# Patient Record
Sex: FEMALE | Race: WHITE | NOT HISPANIC OR LATINO | Employment: FULL TIME | URBAN - METROPOLITAN AREA
[De-identification: names, ages, dates, MRNs, and addresses within clinical notes are randomized per-mention and may not be internally consistent; named-entity substitution may affect disease eponyms.]

---

## 2023-02-24 PROBLEM — K62.5 RECTAL BLEEDING: Status: ACTIVE | Noted: 2023-02-24

## 2023-02-24 PROBLEM — E78.5 LIPIDEMIA: Status: ACTIVE | Noted: 2023-02-24

## 2023-02-24 PROBLEM — G47.33 OBSTRUCTIVE SLEEP APNEA: Status: ACTIVE | Noted: 2023-02-24

## 2023-02-24 PROBLEM — F32.A DEPRESSION: Status: ACTIVE | Noted: 2023-02-24

## 2023-02-24 PROBLEM — N92.0 HEAVY MENSTRUAL BLEEDING: Status: ACTIVE | Noted: 2023-02-24

## 2023-02-24 PROBLEM — I49.1 PAC (PREMATURE ATRIAL CONTRACTION): Status: ACTIVE | Noted: 2023-02-24

## 2023-02-24 PROBLEM — R53.83 FATIGUE: Status: ACTIVE | Noted: 2023-02-24

## 2023-02-24 PROBLEM — H52.13 MYOPIA OF BOTH EYES: Status: ACTIVE | Noted: 2023-02-24

## 2023-02-24 PROBLEM — K21.9 GERD (GASTROESOPHAGEAL REFLUX DISEASE): Status: ACTIVE | Noted: 2023-02-24

## 2023-02-24 PROBLEM — F41.8 DEPRESSION WITH ANXIETY: Status: ACTIVE | Noted: 2023-02-24

## 2023-02-24 PROBLEM — H52.7 REFRACTION ERROR: Status: ACTIVE | Noted: 2023-02-24

## 2023-02-24 PROBLEM — I10 BENIGN ESSENTIAL HYPERTENSION: Status: ACTIVE | Noted: 2023-02-24

## 2023-02-24 PROBLEM — R73.03 PREDIABETES: Status: ACTIVE | Noted: 2023-02-24

## 2023-02-24 PROBLEM — H04.123 DRY EYES, BILATERAL: Status: ACTIVE | Noted: 2023-02-24

## 2023-02-24 PROBLEM — R07.89 ATYPICAL CHEST PAIN: Status: ACTIVE | Noted: 2023-02-24

## 2023-02-24 PROBLEM — E55.9 VITAMIN D DEFICIENCY: Status: ACTIVE | Noted: 2023-02-24

## 2023-02-24 PROBLEM — H01.003 BLEPHARITIS OF RIGHT EYE: Status: ACTIVE | Noted: 2023-02-24

## 2023-02-24 PROBLEM — R94.31 ABNORMAL EKG: Status: ACTIVE | Noted: 2023-02-24

## 2023-02-24 PROBLEM — H01.006 BLEPHARITIS OF LEFT EYE: Status: ACTIVE | Noted: 2023-02-24

## 2023-02-24 PROBLEM — E11.65 UNCONTROLLED TYPE 2 DIABETES MELLITUS WITH HYPERGLYCEMIA (MULTI): Status: ACTIVE | Noted: 2023-02-24

## 2023-02-24 PROBLEM — E78.2 HYPERLIPEMIA, MIXED: Status: ACTIVE | Noted: 2023-02-24

## 2023-02-24 PROBLEM — R00.2 PALPITATIONS: Status: ACTIVE | Noted: 2023-02-24

## 2023-02-24 PROBLEM — M54.6 ACUTE BILATERAL THORACIC BACK PAIN: Status: ACTIVE | Noted: 2023-02-24

## 2023-02-24 PROBLEM — N93.9 ABNORMAL UTERINE BLEEDING: Status: ACTIVE | Noted: 2023-02-24

## 2023-02-24 PROBLEM — J45.909 ASTHMA (HHS-HCC): Status: ACTIVE | Noted: 2023-02-24

## 2023-02-24 RX ORDER — ALBUTEROL SULFATE 90 UG/1
1 AEROSOL, METERED RESPIRATORY (INHALATION) EVERY 4 HOURS PRN
COMMUNITY
Start: 2015-09-22

## 2023-02-24 RX ORDER — BLOOD SUGAR DIAGNOSTIC
1 STRIP MISCELLANEOUS 2 TIMES DAILY
COMMUNITY
Start: 2022-09-08

## 2023-02-24 RX ORDER — TRAZODONE HYDROCHLORIDE 100 MG/1
100 TABLET ORAL NIGHTLY
COMMUNITY
Start: 2022-08-03

## 2023-02-24 RX ORDER — LOSARTAN POTASSIUM 100 MG/1
100 TABLET ORAL DAILY
COMMUNITY
Start: 2021-11-04 | End: 2024-02-12 | Stop reason: SDUPTHER

## 2023-02-24 RX ORDER — NORETHINDRONE 5 MG/1
5 TABLET ORAL DAILY
COMMUNITY
Start: 2021-12-28

## 2023-02-24 RX ORDER — BUPROPION HYDROCHLORIDE 150 MG/1
150 TABLET ORAL
COMMUNITY
Start: 2022-08-03 | End: 2024-02-12 | Stop reason: WASHOUT

## 2023-02-24 RX ORDER — AMLODIPINE BESYLATE 10 MG/1
10 TABLET ORAL DAILY
COMMUNITY
Start: 2019-03-11 | End: 2024-02-07

## 2023-02-24 RX ORDER — VILAZODONE HYDROCHLORIDE 40 MG/1
40 TABLET ORAL DAILY
COMMUNITY
Start: 2013-12-26

## 2023-02-24 RX ORDER — METFORMIN HYDROCHLORIDE 500 MG/1
500 TABLET ORAL
COMMUNITY
Start: 2022-09-08 | End: 2023-04-28 | Stop reason: SDUPTHER

## 2023-04-26 PROBLEM — E11.9 DIABETES MELLITUS TYPE 2 WITHOUT RETINOPATHY (MULTI): Status: ACTIVE | Noted: 2023-04-26

## 2023-04-26 RX ORDER — QUETIAPINE 150 MG/1
150 TABLET, FILM COATED, EXTENDED RELEASE ORAL NIGHTLY
COMMUNITY
Start: 2023-04-02

## 2023-04-26 RX ORDER — ZOLPIDEM TARTRATE 5 MG/1
TABLET ORAL
COMMUNITY
Start: 2022-06-13

## 2023-04-26 RX ORDER — OMEPRAZOLE 40 MG/1
1 CAPSULE, DELAYED RELEASE ORAL DAILY
COMMUNITY
Start: 2022-12-27 | End: 2024-03-26

## 2023-04-26 RX ORDER — CLONAZEPAM 0.5 MG/1
1 TABLET ORAL NIGHTLY PRN
COMMUNITY
Start: 2023-03-15

## 2023-04-26 RX ORDER — LANCETS 30 GAUGE
EACH MISCELLANEOUS
COMMUNITY
Start: 2022-10-04

## 2023-04-26 RX ORDER — DULAGLUTIDE 0.75 MG/.5ML
INJECTION, SOLUTION SUBCUTANEOUS
COMMUNITY
Start: 2022-12-27 | End: 2023-04-28 | Stop reason: ALTCHOICE

## 2023-04-26 RX ORDER — ATORVASTATIN CALCIUM 20 MG/1
TABLET, FILM COATED ORAL
COMMUNITY
Start: 2023-01-03 | End: 2023-06-29

## 2023-04-28 ENCOUNTER — OFFICE VISIT (OUTPATIENT)
Dept: PRIMARY CARE | Facility: CLINIC | Age: 48
End: 2023-04-28
Payer: COMMERCIAL

## 2023-04-28 ENCOUNTER — LAB (OUTPATIENT)
Dept: LAB | Facility: LAB | Age: 48
End: 2023-04-28
Payer: COMMERCIAL

## 2023-04-28 VITALS
BODY MASS INDEX: 33.83 KG/M2 | HEART RATE: 89 BPM | DIASTOLIC BLOOD PRESSURE: 79 MMHG | SYSTOLIC BLOOD PRESSURE: 120 MMHG | WEIGHT: 191 LBS

## 2023-04-28 DIAGNOSIS — E78.5 HYPERLIPIDEMIA, UNSPECIFIED HYPERLIPIDEMIA TYPE: ICD-10-CM

## 2023-04-28 DIAGNOSIS — E11.9 DIABETES MELLITUS TYPE 2 WITHOUT RETINOPATHY (MULTI): ICD-10-CM

## 2023-04-28 DIAGNOSIS — I10 BENIGN ESSENTIAL HYPERTENSION: Primary | ICD-10-CM

## 2023-04-28 DIAGNOSIS — E66.9 CLASS 1 OBESITY WITHOUT SERIOUS COMORBIDITY WITH BODY MASS INDEX (BMI) OF 33.0 TO 33.9 IN ADULT, UNSPECIFIED OBESITY TYPE: ICD-10-CM

## 2023-04-28 DIAGNOSIS — K21.9 GASTROESOPHAGEAL REFLUX DISEASE WITHOUT ESOPHAGITIS: ICD-10-CM

## 2023-04-28 DIAGNOSIS — F41.8 DEPRESSION WITH ANXIETY: ICD-10-CM

## 2023-04-28 PROBLEM — F33.1 MODERATE EPISODE OF RECURRENT MAJOR DEPRESSIVE DISORDER (MULTI): Chronic | Status: ACTIVE | Noted: 2022-04-28

## 2023-04-28 PROBLEM — E78.2 MIXED HYPERLIPIDEMIA: Status: ACTIVE | Noted: 2022-01-17

## 2023-04-28 PROBLEM — F32.0 MAJOR DEPRESSIVE DISORDER, SINGLE EPISODE, MILD (CMS-HCC): Chronic | Status: ACTIVE | Noted: 2022-06-14

## 2023-04-28 LAB
ALANINE AMINOTRANSFERASE (SGPT) (U/L) IN SER/PLAS: 30 U/L (ref 7–45)
ALBUMIN (G/DL) IN SER/PLAS: 4.6 G/DL (ref 3.4–5)
ALKALINE PHOSPHATASE (U/L) IN SER/PLAS: 39 U/L (ref 33–110)
ANION GAP IN SER/PLAS: 15 MMOL/L (ref 10–20)
ASPARTATE AMINOTRANSFERASE (SGOT) (U/L) IN SER/PLAS: 20 U/L (ref 9–39)
BILIRUBIN TOTAL (MG/DL) IN SER/PLAS: 0.5 MG/DL (ref 0–1.2)
CALCIUM (MG/DL) IN SER/PLAS: 9.3 MG/DL (ref 8.6–10.6)
CARBON DIOXIDE, TOTAL (MMOL/L) IN SER/PLAS: 23 MMOL/L (ref 21–32)
CHLORIDE (MMOL/L) IN SER/PLAS: 103 MMOL/L (ref 98–107)
CREATININE (MG/DL) IN SER/PLAS: 0.71 MG/DL (ref 0.5–1.05)
GFR FEMALE: >90 ML/MIN/1.73M2
GLUCOSE (MG/DL) IN SER/PLAS: 247 MG/DL (ref 74–99)
POC HEMOGLOBIN A1C: 8.6 % (ref 4.2–6.5)
POTASSIUM (MMOL/L) IN SER/PLAS: 4.6 MMOL/L (ref 3.5–5.3)
PROTEIN TOTAL: 7.3 G/DL (ref 6.4–8.2)
SODIUM (MMOL/L) IN SER/PLAS: 136 MMOL/L (ref 136–145)
UREA NITROGEN (MG/DL) IN SER/PLAS: 12 MG/DL (ref 6–23)

## 2023-04-28 PROCEDURE — 80053 COMPREHEN METABOLIC PANEL: CPT

## 2023-04-28 PROCEDURE — 36415 COLL VENOUS BLD VENIPUNCTURE: CPT

## 2023-04-28 PROCEDURE — 4010F ACE/ARB THERAPY RXD/TAKEN: CPT | Performed by: INTERNAL MEDICINE

## 2023-04-28 PROCEDURE — 3008F BODY MASS INDEX DOCD: CPT | Performed by: INTERNAL MEDICINE

## 2023-04-28 PROCEDURE — 3074F SYST BP LT 130 MM HG: CPT | Performed by: INTERNAL MEDICINE

## 2023-04-28 PROCEDURE — 99214 OFFICE O/P EST MOD 30 MIN: CPT | Performed by: INTERNAL MEDICINE

## 2023-04-28 PROCEDURE — 83036 HEMOGLOBIN GLYCOSYLATED A1C: CPT | Performed by: INTERNAL MEDICINE

## 2023-04-28 PROCEDURE — 4004F PT TOBACCO SCREEN RCVD TLK: CPT | Performed by: INTERNAL MEDICINE

## 2023-04-28 PROCEDURE — 3078F DIAST BP <80 MM HG: CPT | Performed by: INTERNAL MEDICINE

## 2023-04-28 RX ORDER — METFORMIN HYDROCHLORIDE 500 MG/1
500 TABLET ORAL
Qty: 180 TABLET | Refills: 1 | Status: SHIPPED | OUTPATIENT
Start: 2023-04-28 | End: 2023-12-26

## 2023-04-28 RX ORDER — DULAGLUTIDE 1.5 MG/.5ML
1.5 INJECTION, SOLUTION SUBCUTANEOUS
Qty: 4 EACH | Refills: 11 | Status: SHIPPED | OUTPATIENT
Start: 2023-04-28 | End: 2023-08-11 | Stop reason: SDUPTHER

## 2023-04-28 ASSESSMENT — ENCOUNTER SYMPTOMS
CONSTIPATION: 0
MYALGIAS: 0
FREQUENCY: 0
PALPITATIONS: 0
BLOOD IN STOOL: 0
ARTHRALGIAS: 0
HEADACHES: 0
CHILLS: 0
NERVOUS/ANXIOUS: 1
SLEEP DISTURBANCE: 0
NECK PAIN: 0
ABDOMINAL PAIN: 0
DIZZINESS: 0
FATIGUE: 0
DIFFICULTY URINATING: 0
SINUS PAIN: 0
SINUS PRESSURE: 0
BACK PAIN: 0
COUGH: 0
SHORTNESS OF BREATH: 0
DIARRHEA: 0
FEVER: 0

## 2023-04-28 NOTE — PATIENT INSTRUCTIONS
Hba1c 8.6  Increase the dose of Trulicity to 1.5 mg  Start metformin 500 mg twice a day with food  Start exercising for 30 to 45 minutes a day  Cut back and quit tobacco  Follow-up in 3 months

## 2023-04-28 NOTE — PROGRESS NOTES
Subjective   Patient ID: Linnea Markham is a 48 y.o. female.    Patient is seen today for follow-up on her diabetes.  Her medical history significant for hypertension, diabetes, anxiety, depression, current tobacco use.  Patient states that she has been extremely stressed out due to her work.  She has been using smokeless tobacco but is ready to quit.  She is currently taking Trulicity for diabetes.  She is not taking metformin.  Exercise-occasional         Review of Systems   Constitutional:  Negative for chills, fatigue and fever.   HENT:  Negative for congestion, postnasal drip, sinus pressure and sinus pain.    Respiratory:  Negative for cough and shortness of breath.    Cardiovascular:  Negative for chest pain, palpitations and leg swelling.   Gastrointestinal:  Negative for abdominal pain, blood in stool, constipation and diarrhea.   Endocrine: Negative for cold intolerance and heat intolerance.   Genitourinary:  Negative for difficulty urinating and frequency.   Musculoskeletal:  Negative for arthralgias, back pain, myalgias and neck pain.   Neurological:  Negative for dizziness and headaches.   Psychiatric/Behavioral:  Negative for sleep disturbance. The patient is nervous/anxious.        Objective   Physical Exam  Vitals reviewed.   Constitutional:       General: She is not in acute distress.     Appearance: Normal appearance.   HENT:      Head: Normocephalic and atraumatic.      Mouth/Throat:      Mouth: Mucous membranes are moist.   Eyes:      Extraocular Movements: Extraocular movements intact.      Conjunctiva/sclera: Conjunctivae normal.      Pupils: Pupils are equal, round, and reactive to light.   Cardiovascular:      Rate and Rhythm: Normal rate and regular rhythm.      Pulses: Normal pulses.   Pulmonary:      Effort: Pulmonary effort is normal. No respiratory distress.      Breath sounds: Normal breath sounds.   Abdominal:      General: Bowel sounds are normal. There is no distension.       Tenderness: There is no abdominal tenderness.   Musculoskeletal:         General: No swelling or tenderness. Normal range of motion.      Cervical back: Normal range of motion.   Skin:     General: Skin is warm.   Neurological:      General: No focal deficit present.      Mental Status: She is alert.      Coordination: Coordination normal.      Gait: Gait normal.   Psychiatric:         Mood and Affect: Mood normal.         Behavior: Behavior normal.         Assessment/Plan   Diagnoses and all orders for this visit:  Benign essential hypertension  Comments:  Well-controlled  Continue with amlodipine and losartan  Diabetes mellitus type 2 without retinopathy (CMS/[facility])  Comments:  RaO2c-3.6  Diabetes is worsened  Increase Trulicity to 1.5 mg daily  Restart metformin 500 twice daily  Orders:  -     POCT glycosylated hemoglobin (Hb A1C) manually resulted  -     dulaglutide (Trulicity) 1.5 mg/0.5 mL pen injector; Inject 1.5 mg under the skin 1 (one) time per week.  -     metFORMIN (Glucophage) 500 mg tablet; Take 1 tablet (500 mg) by mouth 2 times a day with meals.  -     Comprehensive Metabolic Panel; Future  Gastroesophageal reflux disease without esophagitis  Comments:  stable  Class 1 obesity without serious comorbidity with body mass index (BMI) of 33.0 to 33.9 in adult, unspecified obesity type  Depression with anxiety  Comments:  Worsening  Continue Wellbutrin  Patient to make appointment with psychiatry  Hyperlipidemia, unspecified hyperlipidemia type  Comments:  Continue with atorvastatin  Check CMP today  Follow in 3 months

## 2023-06-29 DIAGNOSIS — E78.2 MIXED HYPERLIPIDEMIA: ICD-10-CM

## 2023-06-29 RX ORDER — ATORVASTATIN CALCIUM 20 MG/1
TABLET, FILM COATED ORAL
Qty: 90 TABLET | Refills: 1 | Status: SHIPPED | OUTPATIENT
Start: 2023-06-29 | End: 2023-12-26

## 2023-08-03 ENCOUNTER — APPOINTMENT (OUTPATIENT)
Dept: PRIMARY CARE | Facility: CLINIC | Age: 48
End: 2023-08-03
Payer: COMMERCIAL

## 2023-08-11 DIAGNOSIS — E11.9 DIABETES MELLITUS TYPE 2 WITHOUT RETINOPATHY (MULTI): ICD-10-CM

## 2023-08-14 RX ORDER — DULAGLUTIDE 1.5 MG/.5ML
1.5 INJECTION, SOLUTION SUBCUTANEOUS
Qty: 4 EACH | Refills: 8 | Status: SHIPPED | OUTPATIENT
Start: 2023-08-14

## 2023-12-24 DIAGNOSIS — E78.2 MIXED HYPERLIPIDEMIA: ICD-10-CM

## 2023-12-26 DIAGNOSIS — E11.9 DIABETES MELLITUS TYPE 2 WITHOUT RETINOPATHY (MULTI): ICD-10-CM

## 2023-12-26 RX ORDER — METFORMIN HYDROCHLORIDE 500 MG/1
500 TABLET ORAL
Qty: 180 TABLET | Refills: 1 | Status: SHIPPED | OUTPATIENT
Start: 2023-12-26 | End: 2024-05-08

## 2023-12-26 RX ORDER — ATORVASTATIN CALCIUM 20 MG/1
TABLET, FILM COATED ORAL
Qty: 90 TABLET | Refills: 0 | Status: SHIPPED | OUTPATIENT
Start: 2023-12-26 | End: 2024-03-26

## 2024-02-07 DIAGNOSIS — I10 ESSENTIAL (PRIMARY) HYPERTENSION: ICD-10-CM

## 2024-02-07 RX ORDER — AMLODIPINE BESYLATE 10 MG/1
10 TABLET ORAL DAILY
Qty: 90 TABLET | Refills: 0 | Status: SHIPPED | OUTPATIENT
Start: 2024-02-07 | End: 2024-02-12 | Stop reason: SDUPTHER

## 2024-02-12 ENCOUNTER — OFFICE VISIT (OUTPATIENT)
Dept: PRIMARY CARE | Facility: CLINIC | Age: 49
End: 2024-02-12
Payer: COMMERCIAL

## 2024-02-12 VITALS
DIASTOLIC BLOOD PRESSURE: 93 MMHG | SYSTOLIC BLOOD PRESSURE: 145 MMHG | TEMPERATURE: 96 F | BODY MASS INDEX: 33.3 KG/M2 | WEIGHT: 188 LBS

## 2024-02-12 DIAGNOSIS — E11.9 DIABETES MELLITUS TYPE 2 WITHOUT RETINOPATHY (MULTI): Primary | ICD-10-CM

## 2024-02-12 DIAGNOSIS — F41.8 DEPRESSION WITH ANXIETY: ICD-10-CM

## 2024-02-12 DIAGNOSIS — I10 BENIGN ESSENTIAL HYPERTENSION: ICD-10-CM

## 2024-02-12 DIAGNOSIS — E78.5 HYPERLIPIDEMIA, UNSPECIFIED HYPERLIPIDEMIA TYPE: ICD-10-CM

## 2024-02-12 PROBLEM — N93.9 ABNORMAL UTERINE BLEEDING: Status: RESOLVED | Noted: 2023-02-24 | Resolved: 2024-02-12

## 2024-02-12 PROBLEM — H52.13 MYOPIA OF BOTH EYES: Status: RESOLVED | Noted: 2023-02-24 | Resolved: 2024-02-12

## 2024-02-12 PROBLEM — H01.003 BLEPHARITIS OF RIGHT EYE: Status: RESOLVED | Noted: 2023-02-24 | Resolved: 2024-02-12

## 2024-02-12 PROBLEM — R94.31 ABNORMAL EKG: Status: RESOLVED | Noted: 2023-02-24 | Resolved: 2024-02-12

## 2024-02-12 PROBLEM — R73.03 PREDIABETES: Status: RESOLVED | Noted: 2023-02-24 | Resolved: 2024-02-12

## 2024-02-12 PROBLEM — K62.5 RECTAL BLEEDING: Status: RESOLVED | Noted: 2023-02-24 | Resolved: 2024-02-12

## 2024-02-12 PROBLEM — R53.83 FATIGUE: Status: RESOLVED | Noted: 2023-02-24 | Resolved: 2024-02-12

## 2024-02-12 PROBLEM — R07.89 ATYPICAL CHEST PAIN: Status: RESOLVED | Noted: 2023-02-24 | Resolved: 2024-02-12

## 2024-02-12 PROBLEM — J45.909 ASTHMA (HHS-HCC): Status: RESOLVED | Noted: 2023-02-24 | Resolved: 2024-02-12

## 2024-02-12 PROBLEM — H52.7 REFRACTION ERROR: Status: RESOLVED | Noted: 2023-02-24 | Resolved: 2024-02-12

## 2024-02-12 PROBLEM — F32.0 MAJOR DEPRESSIVE DISORDER, SINGLE EPISODE, MILD (CMS-HCC): Chronic | Status: RESOLVED | Noted: 2022-06-14 | Resolved: 2024-02-12

## 2024-02-12 PROBLEM — E78.2 MIXED HYPERLIPIDEMIA: Status: RESOLVED | Noted: 2022-01-17 | Resolved: 2024-02-12

## 2024-02-12 PROBLEM — H01.006 BLEPHARITIS OF LEFT EYE: Status: RESOLVED | Noted: 2023-02-24 | Resolved: 2024-02-12

## 2024-02-12 PROBLEM — M54.6 ACUTE BILATERAL THORACIC BACK PAIN: Status: RESOLVED | Noted: 2023-02-24 | Resolved: 2024-02-12

## 2024-02-12 PROBLEM — R00.2 PALPITATIONS: Status: RESOLVED | Noted: 2023-02-24 | Resolved: 2024-02-12

## 2024-02-12 PROBLEM — F32.A DEPRESSION: Status: RESOLVED | Noted: 2023-02-24 | Resolved: 2024-02-12

## 2024-02-12 LAB — POC HEMOGLOBIN A1C: 10.9 % (ref 4.2–6.5)

## 2024-02-12 PROCEDURE — 3008F BODY MASS INDEX DOCD: CPT | Performed by: INTERNAL MEDICINE

## 2024-02-12 PROCEDURE — 3080F DIAST BP >= 90 MM HG: CPT | Performed by: INTERNAL MEDICINE

## 2024-02-12 PROCEDURE — 83036 HEMOGLOBIN GLYCOSYLATED A1C: CPT | Performed by: INTERNAL MEDICINE

## 2024-02-12 PROCEDURE — 3077F SYST BP >= 140 MM HG: CPT | Performed by: INTERNAL MEDICINE

## 2024-02-12 PROCEDURE — 99214 OFFICE O/P EST MOD 30 MIN: CPT | Performed by: INTERNAL MEDICINE

## 2024-02-12 PROCEDURE — 4010F ACE/ARB THERAPY RXD/TAKEN: CPT | Performed by: INTERNAL MEDICINE

## 2024-02-12 RX ORDER — AMLODIPINE BESYLATE 10 MG/1
10 TABLET ORAL DAILY
Qty: 90 TABLET | Refills: 1 | Status: SHIPPED | OUTPATIENT
Start: 2024-02-12

## 2024-02-12 RX ORDER — LOSARTAN POTASSIUM 100 MG/1
100 TABLET ORAL DAILY
Qty: 90 TABLET | Refills: 1 | Status: SHIPPED | OUTPATIENT
Start: 2024-02-12

## 2024-02-12 ASSESSMENT — ENCOUNTER SYMPTOMS
ARTHRALGIAS: 0
BLOOD IN STOOL: 0
NECK PAIN: 0
FATIGUE: 0
MYALGIAS: 0
CONSTIPATION: 0
COUGH: 0
ABDOMINAL PAIN: 0
HEADACHES: 0
FEVER: 0
DIZZINESS: 0
PALPITATIONS: 0
CHILLS: 0
SHORTNESS OF BREATH: 0
DIARRHEA: 0
BACK PAIN: 0

## 2024-02-12 NOTE — PROGRESS NOTES
Subjective   Patient ID: Linnea Markham is a 48 y.o. female who presents for Diabetes (Pt present today for A1c check/medication refill. ls).    Diabetes  Pertinent negatives for hypoglycemia include no dizziness or headaches. Pertinent negatives for diabetes include no chest pain and no fatigue.   Patient is seen today for follow-up on diabetes.  She has not been able to get Trulicity as pharmacy has ran out.   She is still taking metformin.  She has hypertension, hyperlipidemia, chronic depression and takes medications.  She has chronic insomnia and takes trazodone and Ambien occasionally.    Review of Systems   Constitutional:  Negative for chills, fatigue and fever.   Respiratory:  Negative for cough and shortness of breath.    Cardiovascular:  Negative for chest pain, palpitations and leg swelling.   Gastrointestinal:  Negative for abdominal pain, blood in stool, constipation and diarrhea.   Endocrine: Negative for cold intolerance and heat intolerance.   Musculoskeletal:  Negative for arthralgias, back pain, myalgias and neck pain.   Neurological:  Negative for dizziness and headaches.       Objective   BP (!) 145/93   Temp 35.6 °C (96 °F)   Wt 85.3 kg (188 lb)   BMI 33.30 kg/m²     Physical Exam  Vitals reviewed.   Constitutional:       General: She is not in acute distress.     Appearance: Normal appearance.   HENT:      Head: Normocephalic and atraumatic.   Cardiovascular:      Rate and Rhythm: Normal rate and regular rhythm.      Pulses: Normal pulses.   Pulmonary:      Effort: Pulmonary effort is normal. No respiratory distress.      Breath sounds: Normal breath sounds.   Abdominal:      General: Bowel sounds are normal. There is no distension.      Tenderness: There is no abdominal tenderness.   Musculoskeletal:         General: No swelling or tenderness. Normal range of motion.      Cervical back: Normal range of motion.   Skin:     General: Skin is warm.   Neurological:      General: No focal  deficit present.      Mental Status: She is alert.      Coordination: Coordination normal.      Gait: Gait normal.   Psychiatric:         Mood and Affect: Mood normal.         Behavior: Behavior normal.         Assessment/Plan   Diagnoses and all orders for this visit:  Diabetes mellitus type 2 without retinopathy (CMS/[facility])  Comments:  Hba1c 10.9  Continue metformin twice a day  Start Ozempic 0.5 mg weekly injection  Orders:  -     POCT glycosylated hemoglobin (Hb A1C) manually resulted  -     CBC; Future  -     Comprehensive Metabolic Panel; Future  -     Lipid Panel; Future  -     TSH with reflex to Free T4 if abnormal; Future  -     POCT glycosylated hemoglobin (Hb A1C) manually resulted  -     semaglutide 0.25 mg or 0.5 mg (2 mg/3 mL) pen injector; Inject 0.25 mg under the skin 1 (one) time per week.  Benign essential hypertension  Comments:  Blood pressure is elevated  Continue amlodipine and losartan  Orders:  -     losartan (Cozaar) 100 mg tablet; Take 1 tablet (100 mg) by mouth once daily.  -     amLODIPine (Norvasc) 10 mg tablet; Take 1 tablet (10 mg) by mouth once daily.  Depression with anxiety  Comments:  Stable  Hyperlipidemia, unspecified hyperlipidemia type  Comments:  Continue atorvastatin  Check lipid panel  BMI 33.0-33.9,adult  Comments:  Continue with healthy diet, start exercising  Regular exercise encouraged  Eat healthy, cut back on salt and high sugary foods  Monitor blood pressure at home  Follow-up in 3 months

## 2024-02-12 NOTE — TELEPHONE ENCOUNTER
Can you please  send medication to [facility]. [facility]

## 2024-02-14 ENCOUNTER — LAB (OUTPATIENT)
Dept: LAB | Facility: LAB | Age: 49
End: 2024-02-14
Payer: COMMERCIAL

## 2024-02-14 DIAGNOSIS — E11.9 DIABETES MELLITUS TYPE 2 WITHOUT RETINOPATHY (MULTI): ICD-10-CM

## 2024-02-14 LAB
ALBUMIN SERPL BCP-MCNC: 4.4 G/DL (ref 3.4–5)
ALP SERPL-CCNC: 30 U/L (ref 33–110)
ALT SERPL W P-5'-P-CCNC: 32 U/L (ref 7–45)
ANION GAP SERPL CALC-SCNC: 15 MMOL/L (ref 10–20)
AST SERPL W P-5'-P-CCNC: 23 U/L (ref 9–39)
BILIRUB SERPL-MCNC: 0.8 MG/DL (ref 0–1.2)
BUN SERPL-MCNC: 12 MG/DL (ref 6–23)
CALCIUM SERPL-MCNC: 9.5 MG/DL (ref 8.6–10.6)
CHLORIDE SERPL-SCNC: 101 MMOL/L (ref 98–107)
CHOLEST SERPL-MCNC: 183 MG/DL (ref 0–199)
CHOLESTEROL/HDL RATIO: 4.7
CO2 SERPL-SCNC: 24 MMOL/L (ref 21–32)
CREAT SERPL-MCNC: 0.69 MG/DL (ref 0.5–1.05)
EGFRCR SERPLBLD CKD-EPI 2021: >90 ML/MIN/1.73M*2
ERYTHROCYTE [DISTWIDTH] IN BLOOD BY AUTOMATED COUNT: 12.3 % (ref 11.5–14.5)
GLUCOSE SERPL-MCNC: 236 MG/DL (ref 74–99)
HCT VFR BLD AUTO: 51.5 % (ref 36–46)
HDLC SERPL-MCNC: 39.1 MG/DL
HGB BLD-MCNC: 17.8 G/DL (ref 12–16)
LDLC SERPL CALC-MCNC: 109 MG/DL
MCH RBC QN AUTO: 31.3 PG (ref 26–34)
MCHC RBC AUTO-ENTMCNC: 34.6 G/DL (ref 32–36)
MCV RBC AUTO: 91 FL (ref 80–100)
NON HDL CHOLESTEROL: 144 MG/DL (ref 0–149)
NRBC BLD-RTO: 0 /100 WBCS (ref 0–0)
PLATELET # BLD AUTO: 297 X10*3/UL (ref 150–450)
POTASSIUM SERPL-SCNC: 4.8 MMOL/L (ref 3.5–5.3)
PROT SERPL-MCNC: 7 G/DL (ref 6.4–8.2)
RBC # BLD AUTO: 5.68 X10*6/UL (ref 4–5.2)
SODIUM SERPL-SCNC: 135 MMOL/L (ref 136–145)
TRIGL SERPL-MCNC: 177 MG/DL (ref 0–149)
TSH SERPL-ACNC: 3.89 MIU/L (ref 0.44–3.98)
VLDL: 35 MG/DL (ref 0–40)
WBC # BLD AUTO: 5.1 X10*3/UL (ref 4.4–11.3)

## 2024-02-14 PROCEDURE — 84443 ASSAY THYROID STIM HORMONE: CPT

## 2024-02-14 PROCEDURE — 80061 LIPID PANEL: CPT

## 2024-02-14 PROCEDURE — 85027 COMPLETE CBC AUTOMATED: CPT

## 2024-02-14 PROCEDURE — 80053 COMPREHEN METABOLIC PANEL: CPT

## 2024-02-14 PROCEDURE — 36415 COLL VENOUS BLD VENIPUNCTURE: CPT

## 2024-03-26 DIAGNOSIS — E78.2 MIXED HYPERLIPIDEMIA: ICD-10-CM

## 2024-03-26 DIAGNOSIS — K21.9 GASTRO-ESOPHAGEAL REFLUX DISEASE WITHOUT ESOPHAGITIS: ICD-10-CM

## 2024-03-26 RX ORDER — ATORVASTATIN CALCIUM 20 MG/1
TABLET, FILM COATED ORAL
Qty: 90 TABLET | Refills: 0 | Status: SHIPPED | OUTPATIENT
Start: 2024-03-26

## 2024-03-26 RX ORDER — OMEPRAZOLE 40 MG/1
40 CAPSULE, DELAYED RELEASE ORAL DAILY
Qty: 90 CAPSULE | Refills: 3 | Status: SHIPPED | OUTPATIENT
Start: 2024-03-26

## 2024-04-04 DIAGNOSIS — E11.9 DIABETES MELLITUS TYPE 2 WITHOUT RETINOPATHY (MULTI): ICD-10-CM

## 2024-04-04 RX ORDER — SEMAGLUTIDE 0.68 MG/ML
INJECTION, SOLUTION SUBCUTANEOUS
Qty: 3 ML | Refills: 1 | Status: SHIPPED | OUTPATIENT
Start: 2024-04-04

## 2024-04-08 DIAGNOSIS — E11.9 DIABETES MELLITUS TYPE 2 WITHOUT RETINOPATHY (MULTI): Primary | ICD-10-CM

## 2024-04-11 DIAGNOSIS — E11.9 DIABETES MELLITUS TYPE 2 WITHOUT RETINOPATHY (MULTI): Primary | ICD-10-CM

## 2024-05-08 DIAGNOSIS — E11.9 DIABETES MELLITUS TYPE 2 WITHOUT RETINOPATHY (MULTI): ICD-10-CM

## 2024-05-08 RX ORDER — METFORMIN HYDROCHLORIDE 500 MG/1
500 TABLET ORAL
Qty: 180 TABLET | Refills: 1 | Status: SHIPPED | OUTPATIENT
Start: 2024-05-08

## 2024-05-13 ENCOUNTER — APPOINTMENT (OUTPATIENT)
Dept: PRIMARY CARE | Facility: CLINIC | Age: 49
End: 2024-05-13

## 2024-06-13 ENCOUNTER — APPOINTMENT (OUTPATIENT)
Dept: PRIMARY CARE | Facility: CLINIC | Age: 49
End: 2024-06-13
Payer: COMMERCIAL

## 2024-06-13 VITALS
BODY MASS INDEX: 32.77 KG/M2 | DIASTOLIC BLOOD PRESSURE: 78 MMHG | WEIGHT: 185 LBS | SYSTOLIC BLOOD PRESSURE: 122 MMHG | TEMPERATURE: 98.5 F

## 2024-06-13 DIAGNOSIS — E11.9 DIABETES MELLITUS TYPE 2 WITHOUT RETINOPATHY (MULTI): Primary | ICD-10-CM

## 2024-06-13 DIAGNOSIS — K64.8 OTHER HEMORRHOIDS: ICD-10-CM

## 2024-06-13 DIAGNOSIS — E78.5 HYPERLIPIDEMIA, UNSPECIFIED HYPERLIPIDEMIA TYPE: ICD-10-CM

## 2024-06-13 DIAGNOSIS — I10 BENIGN ESSENTIAL HYPERTENSION: ICD-10-CM

## 2024-06-13 DIAGNOSIS — F41.8 DEPRESSION WITH ANXIETY: ICD-10-CM

## 2024-06-13 LAB — POC HEMOGLOBIN A1C: 9.5 % (ref 4.2–6.5)

## 2024-06-13 PROCEDURE — 3078F DIAST BP <80 MM HG: CPT | Performed by: INTERNAL MEDICINE

## 2024-06-13 PROCEDURE — 99214 OFFICE O/P EST MOD 30 MIN: CPT | Performed by: INTERNAL MEDICINE

## 2024-06-13 PROCEDURE — 3008F BODY MASS INDEX DOCD: CPT | Performed by: INTERNAL MEDICINE

## 2024-06-13 PROCEDURE — 3049F LDL-C 100-129 MG/DL: CPT | Performed by: INTERNAL MEDICINE

## 2024-06-13 PROCEDURE — 83036 HEMOGLOBIN GLYCOSYLATED A1C: CPT | Performed by: INTERNAL MEDICINE

## 2024-06-13 PROCEDURE — 4010F ACE/ARB THERAPY RXD/TAKEN: CPT | Performed by: INTERNAL MEDICINE

## 2024-06-13 PROCEDURE — 3074F SYST BP LT 130 MM HG: CPT | Performed by: INTERNAL MEDICINE

## 2024-06-13 RX ORDER — HYDROCORTISONE ACETATE 25 MG/1
25 SUPPOSITORY RECTAL 2 TIMES DAILY PRN
Qty: 30 SUPPOSITORY | Refills: 0 | Status: SHIPPED | OUTPATIENT
Start: 2024-06-13 | End: 2025-06-13

## 2024-06-13 RX ORDER — BLOOD-GLUCOSE SENSOR
EACH MISCELLANEOUS
Qty: 2 EACH | Refills: 4 | Status: SHIPPED | OUTPATIENT
Start: 2024-06-13

## 2024-06-13 RX ORDER — SEMAGLUTIDE 2.68 MG/ML
2 INJECTION, SOLUTION SUBCUTANEOUS
Qty: 3 ML | Refills: 2 | Status: SHIPPED | OUTPATIENT
Start: 2024-06-13

## 2024-06-13 ASSESSMENT — ENCOUNTER SYMPTOMS
BACK PAIN: 0
HEADACHES: 0
CHEST TIGHTNESS: 0
SLEEP DISTURBANCE: 0
SHORTNESS OF BREATH: 0
PALPITATIONS: 0
DECREASED CONCENTRATION: 0
BLOOD IN STOOL: 0
LIGHT-HEADEDNESS: 0
DYSURIA: 0
WEAKNESS: 0
CHILLS: 0
FLANK PAIN: 0
ABDOMINAL PAIN: 0
APPETITE CHANGE: 0
ARTHRALGIAS: 0
NERVOUS/ANXIOUS: 0
COUGH: 0
DIZZINESS: 0
ACTIVITY CHANGE: 0
WHEEZING: 0
FREQUENCY: 0
NECK PAIN: 0
DIFFICULTY URINATING: 0
SORE THROAT: 0
UNEXPECTED WEIGHT CHANGE: 0

## 2024-06-13 NOTE — PROGRESS NOTES
Subjective   Patient ID: Linnea Markham is a 49 y.o. female who presents for Follow-up (Pt present today for 3 month follow up. ls).    HPI patient is seen today for diabetes follow-up. She was started on Olympic three months ago. Her current dose is 1 mg weekly. She takes metformin. She tries to eat a healthy diet. She has been feeling stressed out lately due to her daughter's health.  She complains of worsening heartburn. She takes omeprazole occasionally.  Medical history is significant for hypertension, hyperlipidemia, anxiety, depression, insomnia.  She is taking all medications. She denies any other concerns.    Review of Systems   Constitutional:  Negative for activity change, appetite change, chills and unexpected weight change.   HENT:  Negative for congestion, postnasal drip and sore throat.    Eyes:  Negative for visual disturbance.   Respiratory:  Negative for cough, chest tightness, shortness of breath and wheezing.    Cardiovascular:  Negative for chest pain, palpitations and leg swelling.   Gastrointestinal:  Negative for abdominal pain and blood in stool.        Worse Acid reflux  Hemorrhoids   Endocrine: Negative for cold intolerance and heat intolerance.   Genitourinary:  Negative for difficulty urinating, dysuria, flank pain and frequency.   Musculoskeletal:  Negative for arthralgias, back pain, gait problem and neck pain.   Skin:  Negative for rash.   Allergic/Immunologic: Negative for environmental allergies and food allergies.   Neurological:  Negative for dizziness, weakness, light-headedness and headaches.   Psychiatric/Behavioral:  Negative for decreased concentration and sleep disturbance. The patient is not nervous/anxious.        Objective   /78 (BP Location: Right arm, Patient Position: Sitting)   Temp 36.9 °C (98.5 °F)   Wt 83.9 kg (185 lb)   BMI 32.77 kg/m²     Physical Exam  Vitals reviewed.   Constitutional:       General: She is not in acute distress.     Appearance:  Normal appearance.   HENT:      Head: Normocephalic and atraumatic.   Cardiovascular:      Rate and Rhythm: Normal rate and regular rhythm.      Pulses: Normal pulses.   Pulmonary:      Effort: Pulmonary effort is normal. No respiratory distress.      Breath sounds: Normal breath sounds.   Abdominal:      General: Bowel sounds are normal. There is no distension.      Tenderness: There is no abdominal tenderness.   Musculoskeletal:         General: No swelling or tenderness. Normal range of motion.   Skin:     General: Skin is warm.   Neurological:      General: No focal deficit present.      Mental Status: She is alert.      Coordination: Coordination normal.      Gait: Gait normal.   Psychiatric:         Mood and Affect: Mood normal.         Behavior: Behavior normal.         Assessment/Plan   Diagnoses and all orders for this visit:  Diabetes mellitus type 2 without retinopathy (Multi)  Comments:  Hemoglobin A1c today 9.5  increase Ozempic to 2 mg weekly  continue metformin 500 BID  refer to endocrinology, start CGM  Orders:  -     semaglutide (Ozempic) 2 mg/dose (8 mg/3 mL) pen injector; Inject 2 mg under the skin every 7 days.  -     POCT glycosylated hemoglobin (Hb A1C) manually resulted  -     Referral to Endocrinology; Future  -     Dexcom G7 Sensor device; Check daily  Benign essential hypertension  Comments:  Stable  continue current medications  Depression with anxiety  Comments:  Follow-up with psychiatry  continue current medications  Hyperlipidemia, unspecified hyperlipidemia type  Comments:  Continue atorvastatin  BMI 32.0-32.9,adult  Comments:  Continue continue healthy diet and regular exercise  Other hemorrhoids  Comments:  Start hydrocortisone rectal suppository  Orders:  -     hydrocortisone (Anusol-HC) 25 mg suppository; Insert 1 suppository (25 mg) into the rectum 2 times a day as needed for hemorrhoids.

## 2024-06-25 DIAGNOSIS — E78.2 MIXED HYPERLIPIDEMIA: ICD-10-CM

## 2024-06-25 RX ORDER — ATORVASTATIN CALCIUM 20 MG/1
TABLET, FILM COATED ORAL
Qty: 90 TABLET | Refills: 3 | Status: SHIPPED | OUTPATIENT
Start: 2024-06-25

## 2024-08-28 ENCOUNTER — TELEPHONE (OUTPATIENT)
Dept: RADIOLOGY | Facility: CLINIC | Age: 49
End: 2024-08-28
Payer: COMMERCIAL

## 2024-08-28 DIAGNOSIS — N92.4 EXCESSIVE BLEEDING IN PREMENOPAUSAL PERIOD: ICD-10-CM

## 2024-08-28 RX ORDER — NORETHINDRONE 5 MG/1
10 TABLET ORAL DAILY
Qty: 30 TABLET | Refills: 0 | Status: SHIPPED | OUTPATIENT
Start: 2024-08-28

## 2024-08-28 NOTE — TELEPHONE ENCOUNTER
Patient needs refill of Norethindrone 10 mg daily, Scheduled for annual exam, refill request sent to Dr. Walters.

## 2024-09-09 ENCOUNTER — TELEPHONE (OUTPATIENT)
Dept: OBSTETRICS AND GYNECOLOGY | Facility: CLINIC | Age: 49
End: 2024-09-09
Payer: COMMERCIAL

## 2024-09-09 DIAGNOSIS — N92.4 EXCESSIVE BLEEDING IN PREMENOPAUSAL PERIOD: ICD-10-CM

## 2024-09-09 RX ORDER — NORETHINDRONE 5 MG/1
10 TABLET ORAL DAILY
Qty: 60 TABLET | Refills: 0 | Status: SHIPPED | OUTPATIENT
Start: 2024-09-09

## 2024-09-09 NOTE — TELEPHONE ENCOUNTER
Spoke with patient, takes Aygestin 10 mg a day, was only give 15 tablets. Will send refill request to [facility] to get patient to appointment.

## 2024-09-09 NOTE — TELEPHONE ENCOUNTER
Linnea L Masterson called in requesting a refill on her birth control. norethindrone (Aygestin) 5 mg tablet is the Rx for the patient.    Anjelica Bundy MA

## 2024-09-10 ENCOUNTER — APPOINTMENT (OUTPATIENT)
Dept: OBSTETRICS AND GYNECOLOGY | Facility: CLINIC | Age: 49
End: 2024-09-10
Payer: COMMERCIAL

## 2024-09-24 DIAGNOSIS — I10 BENIGN ESSENTIAL HYPERTENSION: ICD-10-CM

## 2024-09-24 DIAGNOSIS — E11.9 DIABETES MELLITUS TYPE 2 WITHOUT RETINOPATHY (MULTI): ICD-10-CM

## 2024-09-24 RX ORDER — AMLODIPINE BESYLATE 10 MG/1
10 TABLET ORAL DAILY
Qty: 90 TABLET | Refills: 1 | Status: SHIPPED | OUTPATIENT
Start: 2024-09-24

## 2024-09-24 RX ORDER — METFORMIN HYDROCHLORIDE 500 MG/1
500 TABLET ORAL
Qty: 180 TABLET | Refills: 1 | Status: SHIPPED | OUTPATIENT
Start: 2024-09-24

## 2024-09-24 RX ORDER — LOSARTAN POTASSIUM 100 MG/1
100 TABLET ORAL DAILY
Qty: 90 TABLET | Refills: 1 | Status: SHIPPED | OUTPATIENT
Start: 2024-09-24

## 2024-09-26 DIAGNOSIS — E11.9 DIABETES MELLITUS TYPE 2 WITHOUT RETINOPATHY (MULTI): ICD-10-CM

## 2024-09-26 RX ORDER — SEMAGLUTIDE 2.68 MG/ML
2 INJECTION, SOLUTION SUBCUTANEOUS
Qty: 3 ML | Refills: 2 | Status: SHIPPED | OUTPATIENT
Start: 2024-09-26 | End: 2024-09-27 | Stop reason: SDUPTHER

## 2024-09-27 DIAGNOSIS — E11.9 DIABETES MELLITUS TYPE 2 WITHOUT RETINOPATHY (MULTI): ICD-10-CM

## 2024-09-27 RX ORDER — SEMAGLUTIDE 2.68 MG/ML
2 INJECTION, SOLUTION SUBCUTANEOUS
Qty: 3 ML | Refills: 2 | Status: SHIPPED | OUTPATIENT
Start: 2024-09-27

## 2024-10-01 ENCOUNTER — APPOINTMENT (OUTPATIENT)
Dept: OBSTETRICS AND GYNECOLOGY | Facility: CLINIC | Age: 49
End: 2024-10-01
Payer: COMMERCIAL

## 2024-10-01 VITALS
SYSTOLIC BLOOD PRESSURE: 151 MMHG | HEIGHT: 62 IN | BODY MASS INDEX: 33.86 KG/M2 | DIASTOLIC BLOOD PRESSURE: 84 MMHG | WEIGHT: 184 LBS

## 2024-10-01 DIAGNOSIS — N92.4 EXCESSIVE BLEEDING IN PREMENOPAUSAL PERIOD: ICD-10-CM

## 2024-10-01 DIAGNOSIS — N76.0 ACUTE VAGINITIS: Primary | ICD-10-CM

## 2024-10-01 DIAGNOSIS — Z01.419 WELL WOMAN EXAM WITH ROUTINE GYNECOLOGICAL EXAM: ICD-10-CM

## 2024-10-01 PROCEDURE — 3008F BODY MASS INDEX DOCD: CPT | Performed by: ADVANCED PRACTICE MIDWIFE

## 2024-10-01 PROCEDURE — 99396 PREV VISIT EST AGE 40-64: CPT | Performed by: ADVANCED PRACTICE MIDWIFE

## 2024-10-01 PROCEDURE — 3077F SYST BP >= 140 MM HG: CPT | Performed by: ADVANCED PRACTICE MIDWIFE

## 2024-10-01 PROCEDURE — 4010F ACE/ARB THERAPY RXD/TAKEN: CPT | Performed by: ADVANCED PRACTICE MIDWIFE

## 2024-10-01 PROCEDURE — 3049F LDL-C 100-129 MG/DL: CPT | Performed by: ADVANCED PRACTICE MIDWIFE

## 2024-10-01 PROCEDURE — 87205 SMEAR GRAM STAIN: CPT

## 2024-10-01 PROCEDURE — 3079F DIAST BP 80-89 MM HG: CPT | Performed by: ADVANCED PRACTICE MIDWIFE

## 2024-10-01 RX ORDER — NORETHINDRONE 5 MG/1
10 TABLET ORAL DAILY
Qty: 60 TABLET | Refills: 0 | Status: SHIPPED | OUTPATIENT
Start: 2024-10-01

## 2024-10-01 ASSESSMENT — PATIENT HEALTH QUESTIONNAIRE - PHQ9
SUM OF ALL RESPONSES TO PHQ9 QUESTIONS 1 AND 2: 0
1. LITTLE INTEREST OR PLEASURE IN DOING THINGS: NOT AT ALL
2. FEELING DOWN, DEPRESSED OR HOPELESS: NOT AT ALL

## 2024-10-01 ASSESSMENT — COLUMBIA-SUICIDE SEVERITY RATING SCALE - C-SSRS
6. HAVE YOU EVER DONE ANYTHING, STARTED TO DO ANYTHING, OR PREPARED TO DO ANYTHING TO END YOUR LIFE?: NO
2. HAVE YOU ACTUALLY HAD ANY THOUGHTS OF KILLING YOURSELF?: NO
1. IN THE PAST MONTH, HAVE YOU WISHED YOU WERE DEAD OR WISHED YOU COULD GO TO SLEEP AND NOT WAKE UP?: NO

## 2024-10-01 NOTE — PROGRESS NOTES
"Assessment/Plan   Problem List Items Addressed This Visit             ICD-10-CM       Ob-Gyn Problems    Heavy menstrual bleeding N92.0       Dolly Urbano, APRN-CNM     Frances Yarbrough NANCY Markham is a 49 y.o. female who is here for a routine exam.     Concerns today:  Vaginal itching       No LMP recorded. Patient has had an injection.   Periods are  never , she has been on aygestin for the past 2 years after an episode of heavy bleeding.   Sexual Activity: not sexually active  Pain with intercourse? unknown  Loss of desire? Not currently sexually active  Able to have an orgasm? Yes     History of prior STI: none    Current contraception: none    Last pap:   History of abnormal Pap smear: no  Family history of uterine or ovarian cancer: no    Last mammogram:   History of abnormal mammogram: no  Family history of breast cancer: yes - great aunt  Menstrual History:  OB History          2    Para   2    Term   2       0    AB   0    Living   2         SAB   0    IAB   0    Ectopic   0    Multiple   0    Live Births   2                Menarche age: 13  No LMP recorded. Patient has had an injection.       Objective   /84   Ht 1.575 m (5' 2\")   Wt 83.5 kg (184 lb)   BMI 33.65 kg/m²     Colonoscopy: 2019 - every 5 years    Diet: Real food    Exercise: walks daily  Physical Exam  Constitutional:       Appearance: Normal appearance.   Genitourinary:      Vulva and rectum normal.   HENT:      Head: Normocephalic.      Nose: Nose normal.      Mouth/Throat:      Mouth: Mucous membranes are moist.   Eyes:      Pupils: Pupils are equal, round, and reactive to light.   Cardiovascular:      Rate and Rhythm: Normal rate.      Pulses: Normal pulses.   Pulmonary:      Effort: Pulmonary effort is normal.   Abdominal:      General: Abdomen is flat.      Palpations: Abdomen is soft.   Musculoskeletal:         General: Normal range of motion.      Cervical back: Normal range of motion and " neck supple.   Neurological:      General: No focal deficit present.      Mental Status: She is alert and oriented to person, place, and time.   Skin:     General: Skin is warm.   Psychiatric:         Mood and Affect: Mood normal.         Behavior: Behavior normal. Behavior is cooperative.   Vitals reviewed.        Vaginitis panel sent for c/o of vaginal itching   Will call if positive  Dc aygestin - patient to call if heavy bleeding restarts.   Script sent in in case she starts to bleed heavily.   Mammogram ordered

## 2024-10-03 LAB
CLUE CELLS VAG LPF-#/AREA: NORMAL /[LPF]
NUGENT SCORE: 1
YEAST VAG WET PREP-#/AREA: NORMAL

## 2025-01-17 DIAGNOSIS — E11.9 DIABETES MELLITUS TYPE 2 WITHOUT RETINOPATHY (MULTI): ICD-10-CM

## 2025-01-17 RX ORDER — SEMAGLUTIDE 2.68 MG/ML
INJECTION, SOLUTION SUBCUTANEOUS
Qty: 3 ML | Refills: 0 | Status: SHIPPED | OUTPATIENT
Start: 2025-01-17

## 2025-02-14 ENCOUNTER — APPOINTMENT (OUTPATIENT)
Dept: ENDOCRINOLOGY | Facility: CLINIC | Age: 50
End: 2025-02-14
Payer: COMMERCIAL

## 2025-02-14 VITALS
SYSTOLIC BLOOD PRESSURE: 116 MMHG | DIASTOLIC BLOOD PRESSURE: 88 MMHG | HEART RATE: 64 BPM | HEIGHT: 63 IN | BODY MASS INDEX: 32.25 KG/M2 | WEIGHT: 182 LBS

## 2025-02-14 DIAGNOSIS — E11.9 DIABETES MELLITUS TYPE 2 WITHOUT RETINOPATHY (MULTI): ICD-10-CM

## 2025-02-14 DIAGNOSIS — R94.6 BORDERLINE ABNORMAL TFTS: ICD-10-CM

## 2025-02-14 DIAGNOSIS — E78.5 HYPERLIPIDEMIA, UNSPECIFIED HYPERLIPIDEMIA TYPE: ICD-10-CM

## 2025-02-14 DIAGNOSIS — E11.9 DIABETES MELLITUS TYPE 2 WITHOUT RETINOPATHY (MULTI): Primary | ICD-10-CM

## 2025-02-14 LAB — POC HEMOGLOBIN A1C: 7.8 % (ref 4.2–6.5)

## 2025-02-14 PROCEDURE — 3008F BODY MASS INDEX DOCD: CPT | Performed by: STUDENT IN AN ORGANIZED HEALTH CARE EDUCATION/TRAINING PROGRAM

## 2025-02-14 PROCEDURE — 99204 OFFICE O/P NEW MOD 45 MIN: CPT | Performed by: STUDENT IN AN ORGANIZED HEALTH CARE EDUCATION/TRAINING PROGRAM

## 2025-02-14 PROCEDURE — 3074F SYST BP LT 130 MM HG: CPT | Performed by: STUDENT IN AN ORGANIZED HEALTH CARE EDUCATION/TRAINING PROGRAM

## 2025-02-14 PROCEDURE — 4010F ACE/ARB THERAPY RXD/TAKEN: CPT | Performed by: STUDENT IN AN ORGANIZED HEALTH CARE EDUCATION/TRAINING PROGRAM

## 2025-02-14 PROCEDURE — 3079F DIAST BP 80-89 MM HG: CPT | Performed by: STUDENT IN AN ORGANIZED HEALTH CARE EDUCATION/TRAINING PROGRAM

## 2025-02-14 PROCEDURE — 83036 HEMOGLOBIN GLYCOSYLATED A1C: CPT | Performed by: STUDENT IN AN ORGANIZED HEALTH CARE EDUCATION/TRAINING PROGRAM

## 2025-02-14 RX ORDER — LANCETS
EACH MISCELLANEOUS
Qty: 100 EACH | Refills: 2 | Status: SHIPPED | OUTPATIENT
Start: 2025-02-14

## 2025-02-14 RX ORDER — METFORMIN HYDROCHLORIDE 500 MG/1
500 TABLET ORAL
Qty: 180 TABLET | Refills: 1 | Status: SHIPPED | OUTPATIENT
Start: 2025-02-14

## 2025-02-14 RX ORDER — BLOOD SUGAR DIAGNOSTIC
1 STRIP MISCELLANEOUS 2 TIMES DAILY
Qty: 100 STRIP | Refills: 2 | Status: SHIPPED | OUTPATIENT
Start: 2025-02-14

## 2025-02-14 RX ORDER — TIRZEPATIDE 7.5 MG/.5ML
7.5 INJECTION, SOLUTION SUBCUTANEOUS
Qty: 2 ML | Refills: 11 | Status: SHIPPED | OUTPATIENT
Start: 2025-02-14

## 2025-02-14 NOTE — PROGRESS NOTES
"49 F: HLD, anxiety    Coming in today for diabetes evaluation, referred by PCP     Diabetes History     DM diagnosed: years, cannot recall when   No GDM in the past, has 2 children  Started on antidepressants about 10-15 years ago with continuous weight gain since   Complications Micro and Macro-neuropathy   A1c:   Lab Results   Component Value Date    HGBA1C 7.8 (A) 02/14/2025       Regimen   Ozempic-over a year, has lost weight  about 20 lbs   Currently 2mg for about 6 months  Metformin 1000mg daily       SMBG   Once a day   Later in the day notes 150s   Hypoglycemia-none     Diet: says she is a nervous eater   Usually eats well but has cravings for snacks    Physical activity-works at [employer]     Comorbidities and Screening  Eye Exam: due  Foot exam: needs    Lipid  Lab Results   Component Value Date    CHOL 183 02/14/2024    CHOL 259 (H) 12/30/2022    CHOL 216 (H) 09/06/2022     Lab Results   Component Value Date    HDL 39.1 02/14/2024    HDL 35.7 (A) 12/30/2022    HDL 32.9 (A) 09/06/2022     Lab Results   Component Value Date    LDLCALC 109 (H) 02/14/2024     Lab Results   Component Value Date    TRIG 177 (H) 02/14/2024    TRIG 322 (H) 12/30/2022    TRIG 200 (H) 09/06/2022     No components found for: \"CHOLHDL\"      Statin- atorvastatin 20  Cr and albuminuria- no CKD   Lab Results   Component Value Date    CREATININE 0.69 02/14/2024    EGFR >90 02/14/2024      ACE/ARB- losartan 100    Past Medical History:   Diagnosis Date    Anxiety disorder, unspecified     Anxiety    Personal history of other diseases of the circulatory system     History of hypertension    Personal history of other diseases of the respiratory system     History of asthma    Personal history of other specified conditions     History of insomnia     Family History   Problem Relation Name Age of Onset    Coronary artery disease Father      Coronary artery disease Paternal Grandfather        Social History     Socioeconomic History    " Marital status:      Spouse name: Not on file    Number of children: Not on file    Years of education: Not on file    Highest education level: Not on file   Occupational History    Not on file   Tobacco Use    Smoking status: Former     Current packs/day: 0.50     Average packs/day: 0.5 packs/day for 20.0 years (10.0 ttl pk-yrs)     Types: Cigarettes     Passive exposure: Never    Smokeless tobacco: Current   Vaping Use    Vaping status: Every Day    Substances: Nicotine    Devices: rechargable   Substance and Sexual Activity    Alcohol use: Yes     Alcohol/week: 3.0 standard drinks of alcohol     Types: 3 Glasses of wine per week     Comment: socially    Drug use: Never    Sexual activity: Not Currently     Partners: Male     Birth control/protection: OCP   Other Topics Concern    Not on file   Social History Narrative    Not on file     Social Drivers of Health     Financial Resource Strain: Not on File (2022)    Received from [facility]    Financial Resource Strain     Financial Resource Strain: 0   Food Insecurity: Not on File (2024)    Received from [facility]    Food Insecurity     Food: 0   Transportation Needs: Not on File (2022)    Received from [facility]    Transportation Needs     Transportation: 0   Physical Activity: Not on File (2022)    Received from [facility]    Physical Activity     Physical Activity: 0   Stress: Not on File (2022)    Received from [facility]    Stress     Stress: 0   Social Connections: Not on File (2024)    Received from [place]    Social Connections     Connectedness: 0   Intimate Partner Violence: Not on file   Housing Stability: Not on File (2022)    Received from [facility]    Housing Stability     Housin        ROS:  Negative except those noted in current and interim history    Physical Exam  Constitutional:       Appearance: Normal appearance.   Cardiovascular:      Rate and Rhythm: Normal rate and regular rhythm.    Abdominal:      Palpations: Abdomen is soft.   Skin:     General: Skin is warm.      Comments: No lipodystrophy   Neurological:      Mental Status: She is alert.   Psychiatric:         Mood and Affect: Mood normal.         Behavior: Behavior normal.          labs and imaging reviewed, pertinent findings listed on HPI and Impression      Problem List Items Addressed This Visit       Hyperlipidemia    Diabetes mellitus type 2 without retinopathy (Multi) - Primary    Relevant Medications    tirzepatide (Mounjaro) 7.5 mg/0.5 mL pen injector    metFORMIN (Glucophage) 500 mg tablet    blood sugar diagnostic (Accu-Chek Zhanna Plus test strp) strip    lancets (Lancets,Ultra Thin) misc    Other Relevant Orders    POCT glycosylated hemoglobin (Hb A1C) manually resulted (Completed)    Lipid Panel    Renal Function Panel    Albumin-Creatinine Ratio, Urine Random    CBC     Other Visit Diagnoses       Borderline abnormal TFTs        Relevant Orders    Thyroxine, Free    Thyroid Stimulating Hormone          DM2 with hyperglycemia  A1c and overall BG improving     Switch to mounjaro 7.5mg weekly to maximize weight loss   Continue metformin 500mg BID     Due for repeat labs  May need dose adjustment of statin   Needs foot exam next visit     Follow up in about 4 months

## 2025-02-14 NOTE — PATIENT INSTRUCTIONS
Mounjaro savings card     Mounjaro 7.5mg weekly       Blood sugar goals:  Fasting <140  2 hours after eating <160     Ashley Gage MD  Divison of Endocrinology   [facility]   Phone: [phone]    option 4, then option 1  Fax: [phone]

## 2025-02-15 LAB
ALBUMIN SERPL-MCNC: 4.8 G/DL (ref 3.6–5.1)
ALBUMIN/CREAT UR: 70 MG/G CREAT
BUN SERPL-MCNC: 17 MG/DL (ref 7–25)
BUN/CREAT SERPL: ABNORMAL (CALC) (ref 6–22)
CALCIUM SERPL-MCNC: 9.7 MG/DL (ref 8.6–10.4)
CHLORIDE SERPL-SCNC: 104 MMOL/L (ref 98–110)
CHOLEST SERPL-MCNC: 174 MG/DL
CHOLEST/HDLC SERPL: 2.5 (CALC)
CO2 SERPL-SCNC: 24 MMOL/L (ref 20–32)
CREAT SERPL-MCNC: 0.63 MG/DL (ref 0.5–1.03)
CREAT UR-MCNC: 93 MG/DL (ref 20–275)
EGFRCR SERPLBLD CKD-EPI 2021: 108 ML/MIN/1.73M2
ERYTHROCYTE [DISTWIDTH] IN BLOOD BY AUTOMATED COUNT: 11.7 % (ref 11–15)
GLUCOSE SERPL-MCNC: 123 MG/DL (ref 65–99)
HCT VFR BLD AUTO: 43.1 % (ref 35–45)
HDLC SERPL-MCNC: 70 MG/DL
HGB BLD-MCNC: 14.5 G/DL (ref 11.7–15.5)
LDLC SERPL CALC-MCNC: 83 MG/DL (CALC)
MCH RBC QN AUTO: 31.5 PG (ref 27–33)
MCHC RBC AUTO-ENTMCNC: 33.6 G/DL (ref 32–36)
MCV RBC AUTO: 93.5 FL (ref 80–100)
MICROALBUMIN UR-MCNC: 6.5 MG/DL
NONHDLC SERPL-MCNC: 104 MG/DL (CALC)
PHOSPHATE SERPL-MCNC: 4.8 MG/DL (ref 2.5–4.5)
PLATELET # BLD AUTO: 293 THOUSAND/UL (ref 140–400)
PMV BLD REES-ECKER: 10.8 FL (ref 7.5–12.5)
POTASSIUM SERPL-SCNC: 4.7 MMOL/L (ref 3.5–5.3)
RBC # BLD AUTO: 4.61 MILLION/UL (ref 3.8–5.1)
SODIUM SERPL-SCNC: 139 MMOL/L (ref 135–146)
T4 FREE SERPL-MCNC: 0.9 NG/DL (ref 0.8–1.8)
TRIGL SERPL-MCNC: 114 MG/DL
TSH SERPL-ACNC: 2.12 MIU/L
WBC # BLD AUTO: 5.9 THOUSAND/UL (ref 3.8–10.8)

## 2025-02-18 RX ORDER — TIRZEPATIDE 7.5 MG/.5ML
7.5 INJECTION, SOLUTION SUBCUTANEOUS
Qty: 2 ML | Refills: 11 | Status: SHIPPED | OUTPATIENT
Start: 2025-02-23

## 2025-03-19 DIAGNOSIS — K21.9 GASTRO-ESOPHAGEAL REFLUX DISEASE WITHOUT ESOPHAGITIS: ICD-10-CM

## 2025-03-19 DIAGNOSIS — I10 BENIGN ESSENTIAL HYPERTENSION: ICD-10-CM

## 2025-03-23 RX ORDER — LOSARTAN POTASSIUM 100 MG/1
100 TABLET ORAL DAILY
Qty: 90 TABLET | Refills: 0 | Status: SHIPPED | OUTPATIENT
Start: 2025-03-23

## 2025-03-23 RX ORDER — OMEPRAZOLE 40 MG/1
40 CAPSULE, DELAYED RELEASE ORAL DAILY
Qty: 90 CAPSULE | Refills: 0 | Status: SHIPPED | OUTPATIENT
Start: 2025-03-23

## 2025-03-23 RX ORDER — AMLODIPINE BESYLATE 10 MG/1
10 TABLET ORAL DAILY
Qty: 90 TABLET | Refills: 0 | Status: SHIPPED | OUTPATIENT
Start: 2025-03-23

## 2025-05-20 ENCOUNTER — APPOINTMENT (OUTPATIENT)
Dept: OBSTETRICS AND GYNECOLOGY | Facility: CLINIC | Age: 50
End: 2025-05-20
Payer: COMMERCIAL

## 2025-05-20 VITALS
HEIGHT: 63 IN | DIASTOLIC BLOOD PRESSURE: 67 MMHG | SYSTOLIC BLOOD PRESSURE: 114 MMHG | BODY MASS INDEX: 31.54 KG/M2 | WEIGHT: 178 LBS

## 2025-05-20 DIAGNOSIS — N93.9 EPISODE OF HEAVY VAGINAL BLEEDING: Primary | ICD-10-CM

## 2025-05-20 PROCEDURE — 3074F SYST BP LT 130 MM HG: CPT | Performed by: ADVANCED PRACTICE MIDWIFE

## 2025-05-20 PROCEDURE — 3051F HG A1C>EQUAL 7.0%<8.0%: CPT | Performed by: ADVANCED PRACTICE MIDWIFE

## 2025-05-20 PROCEDURE — 3078F DIAST BP <80 MM HG: CPT | Performed by: ADVANCED PRACTICE MIDWIFE

## 2025-05-20 PROCEDURE — 99215 OFFICE O/P EST HI 40 MIN: CPT | Performed by: ADVANCED PRACTICE MIDWIFE

## 2025-05-20 PROCEDURE — 4010F ACE/ARB THERAPY RXD/TAKEN: CPT | Performed by: ADVANCED PRACTICE MIDWIFE

## 2025-05-20 PROCEDURE — 3008F BODY MASS INDEX DOCD: CPT | Performed by: ADVANCED PRACTICE MIDWIFE

## 2025-05-20 NOTE — PROGRESS NOTES
Subjective   Patient ID: Linnea Markham is a 50 y.o. female who presents for est patient visit (Heavy bleeding LMP 05/14/2025 Painful, stabbing pain. Patient is on Birth control Pill./).  HPI  Since her last appointment she had no periods until April light period and then may it was very heavy. she started the aygestin again at that time.   Review of Systems    Objective   Physical Exam  On speculum exam no evidence of cervical polyp as cause for vaginal bleeding.     Assessment/Plan   Problem List Items Addressed This Visit    None  Visit Diagnoses         Codes      Episode of heavy vaginal bleeding    -  Primary N93.9    Relevant Medications    drospirenone, contraceptive, 4 mg (28) tablet    Other Relevant Orders    US PELVIS TRANSABDOMINAL WITH TRANSVAGINAL        Patient again counseled to dc aygestin 10 mg daily  Reviewed risks of long term use  Will try slynd for control of heavy vaginal bleeding with periods, as she has not reached menopause  Pelvic US also ordered to assess for other possible causes of heavy bleeding  RTO to review US and management ZOILA Russell 05/20/25 11:58 AM

## 2025-05-23 ENCOUNTER — APPOINTMENT (OUTPATIENT)
Dept: RADIOLOGY | Facility: CLINIC | Age: 50
End: 2025-05-23
Payer: COMMERCIAL

## 2025-05-28 ENCOUNTER — HOSPITAL ENCOUNTER (OUTPATIENT)
Dept: RADIOLOGY | Facility: HOSPITAL | Age: 50
Discharge: HOME | End: 2025-05-28
Payer: COMMERCIAL

## 2025-05-28 DIAGNOSIS — N93.9 EPISODE OF HEAVY VAGINAL BLEEDING: ICD-10-CM

## 2025-05-28 PROCEDURE — 76830 TRANSVAGINAL US NON-OB: CPT

## 2025-05-28 PROCEDURE — 76830 TRANSVAGINAL US NON-OB: CPT | Performed by: RADIOLOGY

## 2025-05-28 PROCEDURE — 76856 US EXAM PELVIC COMPLETE: CPT | Performed by: RADIOLOGY

## 2025-05-29 ENCOUNTER — PATIENT MESSAGE (OUTPATIENT)
Dept: OBSTETRICS AND GYNECOLOGY | Facility: CLINIC | Age: 50
End: 2025-05-29
Payer: COMMERCIAL

## 2025-05-30 ENCOUNTER — APPOINTMENT (OUTPATIENT)
Dept: RADIOLOGY | Facility: CLINIC | Age: 50
End: 2025-05-30
Payer: COMMERCIAL

## 2025-05-30 ENCOUNTER — TELEPHONE (OUTPATIENT)
Dept: OBSTETRICS AND GYNECOLOGY | Facility: CLINIC | Age: 50
End: 2025-05-30

## 2025-05-30 NOTE — TELEPHONE ENCOUNTER
Call to patient regarding bleeding. She is currently soaking a tampon every two hours.   Reviewed bleeding precautions and when to present for care.   Recommended she dc the current protocol and start back on the aygestin to help control the bleeding.   Chart reviewed with Dr. Metzger. He is amenable to seeing the patient for EMB and possible surgical consult. Chart forwarded to office staff to help schedule for earliest available.   All questions answered.

## 2025-06-18 DIAGNOSIS — I10 BENIGN ESSENTIAL HYPERTENSION: ICD-10-CM

## 2025-06-18 DIAGNOSIS — K21.9 GASTRO-ESOPHAGEAL REFLUX DISEASE WITHOUT ESOPHAGITIS: ICD-10-CM

## 2025-06-18 DIAGNOSIS — E78.2 MIXED HYPERLIPIDEMIA: ICD-10-CM

## 2025-06-20 RX ORDER — LOSARTAN POTASSIUM 100 MG/1
100 TABLET ORAL DAILY
Qty: 30 TABLET | Refills: 1 | Status: SHIPPED | OUTPATIENT
Start: 2025-06-20

## 2025-06-20 RX ORDER — AMLODIPINE BESYLATE 10 MG/1
10 TABLET ORAL DAILY
Qty: 30 TABLET | Refills: 1 | Status: SHIPPED | OUTPATIENT
Start: 2025-06-20

## 2025-06-20 RX ORDER — ATORVASTATIN CALCIUM 20 MG/1
20 TABLET, FILM COATED ORAL NIGHTLY
Qty: 30 TABLET | Refills: 1 | Status: SHIPPED | OUTPATIENT
Start: 2025-06-20

## 2025-06-20 RX ORDER — OMEPRAZOLE 40 MG/1
40 CAPSULE, DELAYED RELEASE ORAL DAILY
Qty: 30 CAPSULE | Refills: 1 | Status: SHIPPED | OUTPATIENT
Start: 2025-06-20

## 2025-06-25 ENCOUNTER — PROCEDURE VISIT (OUTPATIENT)
Dept: OBSTETRICS AND GYNECOLOGY | Facility: CLINIC | Age: 50
End: 2025-06-25
Payer: COMMERCIAL

## 2025-06-25 VITALS — BODY MASS INDEX: 32.03 KG/M2 | WEIGHT: 180.8 LBS | SYSTOLIC BLOOD PRESSURE: 140 MMHG | DIASTOLIC BLOOD PRESSURE: 76 MMHG

## 2025-06-25 DIAGNOSIS — N93.9 ABNORMAL UTERINE BLEEDING (AUB): Primary | ICD-10-CM

## 2025-06-25 PROCEDURE — 88305 TISSUE EXAM BY PATHOLOGIST: CPT | Performed by: PATHOLOGY

## 2025-06-25 PROCEDURE — 88175 CYTOPATH C/V AUTO FLUID REDO: CPT

## 2025-06-25 PROCEDURE — 58100 BIOPSY OF UTERUS LINING: CPT | Performed by: STUDENT IN AN ORGANIZED HEALTH CARE EDUCATION/TRAINING PROGRAM

## 2025-06-25 PROCEDURE — 87626 HPV SEP HI-RSK TYP&POOL RSLT: CPT

## 2025-06-25 PROCEDURE — 88305 TISSUE EXAM BY PATHOLOGIST: CPT

## 2025-06-25 ASSESSMENT — PAIN SCALES - GENERAL: PAINLEVEL_OUTOF10: 1

## 2025-06-25 NOTE — PROGRESS NOTES
Patient ID: Linnea Markham is a 50 y.o. female.    Endometrial biopsy    Date/Time: 6/25/2025 3:09 PM    Performed by: Erasmo Metzger MD  Authorized by: Erasmo Metzger MD    Consent:     Consent obtained: verbal and written    Consent given by: patient    Risks discussed: bleeding, damage to other organs, infection and need for repeat procedure  Indications:     Indications: abnormal uterine bleeding    Pre-procedure:     Urine pregnancy test: negative    Procedure:     Prepped with: none    Tenaculum used: yes      A local block was performed: no      Cervix dilated: no      Number of passes: 3  Findings:     Cervix: normal      Uterus depth by sound (cm): 9    Specimen collected: specimen collected and sent to pathology      Patient tolerance: tolerated well, no immediate complications  Comments:     Procedure comments: Okay to endometrial biopsy with uterine explora

## 2025-06-26 ENCOUNTER — APPOINTMENT (OUTPATIENT)
Dept: OBSTETRICS AND GYNECOLOGY | Facility: CLINIC | Age: 50
End: 2025-06-26
Payer: COMMERCIAL

## 2025-06-30 ENCOUNTER — TELEPHONE (OUTPATIENT)
Dept: OBSTETRICS AND GYNECOLOGY | Facility: CLINIC | Age: 50
End: 2025-06-30
Payer: COMMERCIAL

## 2025-06-30 DIAGNOSIS — N92.4 EXCESSIVE BLEEDING IN PREMENOPAUSAL PERIOD: ICD-10-CM

## 2025-06-30 RX ORDER — NORETHINDRONE 5 MG/1
10 TABLET ORAL DAILY
Qty: 60 TABLET | Refills: 0 | Status: SHIPPED | OUTPATIENT
Start: 2025-06-30

## 2025-06-30 NOTE — TELEPHONE ENCOUNTER
This is  patient and she said she need her prescription refilled today please she all out of Norethindrone 5mg twicw a day

## 2025-06-30 NOTE — TELEPHONE ENCOUNTER
Patient has annual exam scheduled with Dr. Metzger on 7/25/25. She is requesting a refill of Aygestin until she can be seen. Will pend to covering provider for review to last her until that appt.

## 2025-07-01 LAB
LABORATORY COMMENT REPORT: NORMAL
PATH REPORT.FINAL DX SPEC: NORMAL
PATH REPORT.GROSS SPEC: NORMAL
PATH REPORT.RELEVANT HX SPEC: NORMAL
PATH REPORT.TOTAL CANCER: NORMAL

## 2025-07-07 LAB
CYTOLOGY CMNT CVX/VAG CYTO-IMP: NORMAL
HPV HR 12 DNA GENITAL QL NAA+PROBE: NEGATIVE
HPV HR GENOTYPES PNL CVX NAA+PROBE: NEGATIVE
HPV16 DNA SPEC QL NAA+PROBE: NEGATIVE
HPV18 DNA SPEC QL NAA+PROBE: NEGATIVE
LAB AP HPV GENOTYPE QUESTION: YES
LAB AP HPV HR: NORMAL
LABORATORY COMMENT REPORT: NORMAL
PATH REPORT.TOTAL CANCER: NORMAL

## 2025-07-09 ENCOUNTER — APPOINTMENT (OUTPATIENT)
Dept: ENDOCRINOLOGY | Facility: CLINIC | Age: 50
End: 2025-07-09
Payer: COMMERCIAL

## 2025-07-09 VITALS
HEIGHT: 63 IN | BODY MASS INDEX: 32.07 KG/M2 | HEART RATE: 66 BPM | WEIGHT: 181 LBS | SYSTOLIC BLOOD PRESSURE: 126 MMHG | DIASTOLIC BLOOD PRESSURE: 104 MMHG

## 2025-07-09 DIAGNOSIS — R80.9 TYPE 2 DIABETES MELLITUS WITH ALBUMINURIA (MULTI): Primary | ICD-10-CM

## 2025-07-09 DIAGNOSIS — E11.29 TYPE 2 DIABETES MELLITUS WITH ALBUMINURIA (MULTI): Primary | ICD-10-CM

## 2025-07-09 DIAGNOSIS — E11.9 TYPE 2 DIABETES MELLITUS WITHOUT COMPLICATION, WITHOUT LONG-TERM CURRENT USE OF INSULIN: ICD-10-CM

## 2025-07-09 LAB — POC HEMOGLOBIN A1C: 7.1 % (ref 4.2–6.5)

## 2025-07-09 PROCEDURE — 3051F HG A1C>EQUAL 7.0%<8.0%: CPT | Performed by: STUDENT IN AN ORGANIZED HEALTH CARE EDUCATION/TRAINING PROGRAM

## 2025-07-09 PROCEDURE — 3074F SYST BP LT 130 MM HG: CPT | Performed by: STUDENT IN AN ORGANIZED HEALTH CARE EDUCATION/TRAINING PROGRAM

## 2025-07-09 PROCEDURE — 83036 HEMOGLOBIN GLYCOSYLATED A1C: CPT | Performed by: STUDENT IN AN ORGANIZED HEALTH CARE EDUCATION/TRAINING PROGRAM

## 2025-07-09 PROCEDURE — 99214 OFFICE O/P EST MOD 30 MIN: CPT | Performed by: STUDENT IN AN ORGANIZED HEALTH CARE EDUCATION/TRAINING PROGRAM

## 2025-07-09 PROCEDURE — 3008F BODY MASS INDEX DOCD: CPT | Performed by: STUDENT IN AN ORGANIZED HEALTH CARE EDUCATION/TRAINING PROGRAM

## 2025-07-09 PROCEDURE — 3080F DIAST BP >= 90 MM HG: CPT | Performed by: STUDENT IN AN ORGANIZED HEALTH CARE EDUCATION/TRAINING PROGRAM

## 2025-07-09 PROCEDURE — 4010F ACE/ARB THERAPY RXD/TAKEN: CPT | Performed by: STUDENT IN AN ORGANIZED HEALTH CARE EDUCATION/TRAINING PROGRAM

## 2025-07-09 RX ORDER — TIRZEPATIDE 10 MG/.5ML
10 INJECTION, SOLUTION SUBCUTANEOUS
Qty: 2 ML | Refills: 11 | Status: SHIPPED | OUTPATIENT
Start: 2025-07-09

## 2025-07-09 ASSESSMENT — PAIN SCALES - GENERAL: PAINLEVEL_OUTOF10: 0-NO PAIN

## 2025-07-09 NOTE — PATIENT INSTRUCTIONS
Mounjaro 10mg weekly   Continue metformin for now     Follow up next available    Ashley Gage MD  Divison of Endocrinology   [facility]   Phone: [phone]    option 4, then option 1  Fax: [phone]

## 2025-07-09 NOTE — PROGRESS NOTES
"50 F: HLD, anxiety    Following up regarding DM2    Diabetes History     DM diagnosed: years, cannot recall when   No GDM in the past, has 2 children  Started on antidepressants about 10-15 years ago with continuous weight gain since   Complications Micro and Macro-neuropathy   A1c:   Lab Results   Component Value Date    HGBA1C 7.1 (A) 07/09/2025       Regimen   Mounjaro 7.5mg weekly   Metformin 1000mg daily     Previously  Ozempic       SMBG   In the 100s   Hypoglycemia-none     Diet: says she is a nervous eater   Curbed appetite     Starting to walk     Physical activity-works at [employer]     GYN  On PG   Currently on work up     Comorbidities and Screening  Eye Exam: due  Foot exam: needs    Lipid  Lab Results   Component Value Date    CHOL 174 02/14/2025    CHOL 183 02/14/2024    CHOL 259 (H) 12/30/2022     Lab Results   Component Value Date    HDL 70 02/14/2025    HDL 39.1 02/14/2024    HDL 35.7 (A) 12/30/2022     Lab Results   Component Value Date    LDLCALC 83 02/14/2025    LDLCALC 109 (H) 02/14/2024     Lab Results   Component Value Date    TRIG 114 02/14/2025    TRIG 177 (H) 02/14/2024    TRIG 322 (H) 12/30/2022     No components found for: \"CHOLHDL\"      Statin- atorvastatin 20  Cr and albuminuria- no CKD  + albuminuria  Lab Results   Component Value Date    CREATININE 0.63 02/14/2025    EGFR 108 02/14/2025    ALBUMINUR 6.5 02/14/2025      ACE/ARB- losartan 100    Past Medical History:   Diagnosis Date    Anxiety disorder, unspecified     Anxiety    Personal history of other diseases of the circulatory system     History of hypertension    Personal history of other diseases of the respiratory system     History of asthma    Personal history of other specified conditions     History of insomnia     Family History   Problem Relation Name Age of Onset    Coronary artery disease Father      Coronary artery disease Paternal Grandfather        Social History     Socioeconomic History    Marital status: "      Spouse name: Not on file    Number of children: Not on file    Years of education: Not on file    Highest education level: Not on file   Occupational History    Not on file   Tobacco Use    Smoking status: Former     Current packs/day: 0.50     Average packs/day: 0.5 packs/day for 20.0 years (10.0 ttl pk-yrs)     Types: Cigarettes     Passive exposure: Never    Smokeless tobacco: Current   Vaping Use    Vaping status: Every Day    Substances: Nicotine    Devices: rechargable   Substance and Sexual Activity    Alcohol use: Yes     Alcohol/week: 3.0 standard drinks of alcohol     Types: 3 Glasses of wine per week     Comment: socially    Drug use: Never    Sexual activity: Not Currently     Partners: Male     Birth control/protection: OCP   Other Topics Concern    Not on file   Social History Narrative    Not on file     Social Drivers of Health     Financial Resource Strain: Not on File (2022)    Received from [facility]    Financial Resource Strain     Financial Resource Strain: 0   Food Insecurity: Not on File (2024)    Received from [facility]    Food Insecurity     Food: 0   Transportation Needs: Not on File (2022)    Received from [facility]    Transportation Needs     Transportation: 0   Physical Activity: Not on File (2022)    Received from [facility]    Physical Activity     Physical Activity: 0   Stress: Not on File (2022)    Received from [facility]    Stress     Stress: 0   Social Connections: Not on File (2024)    Received from [facility]    Social Connections     Connectedness: 0   Intimate Partner Violence: Not on file   Housing Stability: Not on File (2022)    Received from [facility]    Housing Stability     Housin        ROS:  Negative except those noted in current and interim history    Physical Exam  Constitutional:       Appearance: Normal appearance.   Cardiovascular:      Rate and Rhythm: Normal rate and regular rhythm.   Abdominal:      Palpations: Abdomen is soft.   Skin:      General: Skin is warm.      Comments: No lipodystrophy   Neurological:      General: No focal deficit present.      Mental Status: She is alert and oriented to person, place, and time.          labs and imaging reviewed, pertinent findings listed on HPI and Impression      Problem List Items Addressed This Visit       Type 2 diabetes mellitus with albuminuria (Multi) - Primary    Relevant Medications    tirzepatide (Mounjaro) 10 mg/0.5 mL pen injector    Other Relevant Orders    Albumin-Creatinine Ratio, Urine Random     Other Visit Diagnoses         Type 2 diabetes mellitus without complication, without long-term current use of insulin        Relevant Orders    POCT glycosylated hemoglobin (Hb A1C) manually resulted (Completed)            DM2 with hyperglycemia  With albuminuria?  A1c and overall BG improving     Mounjaro increase to 10mg weekly   Continue Metformin 500mg BID     Repeat urine albumin     Advised regarding eye exam    Follow up in 6 months

## 2025-07-10 LAB
ALBUMIN/CREAT UR: 139 MG/G CREAT
CREAT UR-MCNC: 116 MG/DL (ref 20–275)
MICROALBUMIN UR-MCNC: 16.1 MG/DL

## 2025-07-22 DIAGNOSIS — N92.4 EXCESSIVE BLEEDING IN PREMENOPAUSAL PERIOD: ICD-10-CM

## 2025-07-22 RX ORDER — NORETHINDRONE 5 MG/1
10 TABLET ORAL DAILY
Qty: 180 TABLET | Refills: 1 | Status: SHIPPED | OUTPATIENT
Start: 2025-07-22

## 2025-07-22 NOTE — TELEPHONE ENCOUNTER
Patient is requesting a 90d supply. She was last seen in office on 06/25/25. Medication was last refilled on 06/30/2025 for a 30 day supply.

## 2025-07-25 ENCOUNTER — APPOINTMENT (OUTPATIENT)
Dept: OBSTETRICS AND GYNECOLOGY | Facility: CLINIC | Age: 50
End: 2025-07-25
Payer: COMMERCIAL

## 2025-07-25 VITALS
BODY MASS INDEX: 32.25 KG/M2 | HEIGHT: 63 IN | DIASTOLIC BLOOD PRESSURE: 84 MMHG | WEIGHT: 182 LBS | SYSTOLIC BLOOD PRESSURE: 130 MMHG

## 2025-07-25 DIAGNOSIS — N93.9 ABNORMAL UTERINE BLEEDING (AUB): Primary | ICD-10-CM

## 2025-07-25 DIAGNOSIS — N95.1 MENOPAUSE SYNDROME: ICD-10-CM

## 2025-07-25 DIAGNOSIS — R10.2 FEMALE PELVIC PAIN: ICD-10-CM

## 2025-07-25 PROCEDURE — 3051F HG A1C>EQUAL 7.0%<8.0%: CPT | Performed by: STUDENT IN AN ORGANIZED HEALTH CARE EDUCATION/TRAINING PROGRAM

## 2025-07-25 PROCEDURE — 3008F BODY MASS INDEX DOCD: CPT | Performed by: STUDENT IN AN ORGANIZED HEALTH CARE EDUCATION/TRAINING PROGRAM

## 2025-07-25 PROCEDURE — 99213 OFFICE O/P EST LOW 20 MIN: CPT | Performed by: STUDENT IN AN ORGANIZED HEALTH CARE EDUCATION/TRAINING PROGRAM

## 2025-07-25 PROCEDURE — 4010F ACE/ARB THERAPY RXD/TAKEN: CPT | Performed by: STUDENT IN AN ORGANIZED HEALTH CARE EDUCATION/TRAINING PROGRAM

## 2025-07-25 PROCEDURE — 3075F SYST BP GE 130 - 139MM HG: CPT | Performed by: STUDENT IN AN ORGANIZED HEALTH CARE EDUCATION/TRAINING PROGRAM

## 2025-07-25 PROCEDURE — 3079F DIAST BP 80-89 MM HG: CPT | Performed by: STUDENT IN AN ORGANIZED HEALTH CARE EDUCATION/TRAINING PROGRAM

## 2025-07-25 ASSESSMENT — PAIN SCALES - GENERAL: PAINLEVEL_OUTOF10: 0-NO PAIN

## 2025-08-06 DIAGNOSIS — Z12.31 ENCOUNTER FOR SCREENING MAMMOGRAM FOR BREAST CANCER: ICD-10-CM

## 2025-08-07 ENCOUNTER — APPOINTMENT (OUTPATIENT)
Dept: OBSTETRICS AND GYNECOLOGY | Facility: CLINIC | Age: 50
End: 2025-08-07
Payer: COMMERCIAL

## 2025-08-07 VITALS — WEIGHT: 183.8 LBS | SYSTOLIC BLOOD PRESSURE: 121 MMHG | BODY MASS INDEX: 32.56 KG/M2 | DIASTOLIC BLOOD PRESSURE: 70 MMHG

## 2025-08-07 DIAGNOSIS — R10.2 FEMALE PELVIC PAIN: Primary | ICD-10-CM

## 2025-08-07 DIAGNOSIS — N93.9 ABNORMAL UTERINE BLEEDING (AUB): ICD-10-CM

## 2025-08-07 PROCEDURE — 4010F ACE/ARB THERAPY RXD/TAKEN: CPT | Performed by: STUDENT IN AN ORGANIZED HEALTH CARE EDUCATION/TRAINING PROGRAM

## 2025-08-07 PROCEDURE — 3074F SYST BP LT 130 MM HG: CPT | Performed by: STUDENT IN AN ORGANIZED HEALTH CARE EDUCATION/TRAINING PROGRAM

## 2025-08-07 PROCEDURE — 3078F DIAST BP <80 MM HG: CPT | Performed by: STUDENT IN AN ORGANIZED HEALTH CARE EDUCATION/TRAINING PROGRAM

## 2025-08-07 PROCEDURE — 99214 OFFICE O/P EST MOD 30 MIN: CPT | Performed by: STUDENT IN AN ORGANIZED HEALTH CARE EDUCATION/TRAINING PROGRAM

## 2025-08-07 PROCEDURE — 3051F HG A1C>EQUAL 7.0%<8.0%: CPT | Performed by: STUDENT IN AN ORGANIZED HEALTH CARE EDUCATION/TRAINING PROGRAM

## 2025-08-07 RX ORDER — GABAPENTIN 600 MG/1
600 TABLET ORAL ONCE
OUTPATIENT
Start: 2025-08-07 | End: 2025-08-07

## 2025-08-07 RX ORDER — ACETAMINOPHEN 325 MG/1
975 TABLET ORAL ONCE
OUTPATIENT
Start: 2025-08-07 | End: 2025-08-07

## 2025-08-07 RX ORDER — CEFAZOLIN SODIUM 2 G/100ML
2 INJECTION, SOLUTION INTRAVENOUS ONCE
OUTPATIENT
Start: 2025-08-07 | End: 2025-08-07

## 2025-08-07 RX ORDER — METRONIDAZOLE 500 MG/100ML
500 INJECTION, SOLUTION INTRAVENOUS ONCE
OUTPATIENT
Start: 2025-08-07 | End: 2025-08-07

## 2025-08-07 RX ORDER — CELECOXIB 400 MG/1
400 CAPSULE ORAL ONCE
OUTPATIENT
Start: 2025-08-07 | End: 2025-08-07

## 2025-08-07 ASSESSMENT — PAIN SCALES - GENERAL: PAINLEVEL_OUTOF10: 0-NO PAIN

## 2025-08-07 NOTE — PROGRESS NOTES
Subjective   Patient ID: Linnea Markham is a 50 y.o. female who presents for Follow-up (Patient here to discuss hysterectomy.).  Heavy Vag Bleeding controlled when taking NE. Pain not controlled with NE or PRN tylenol/motrin. Pain is stabbing pain. Also failed OCPs.    OBHx: CSx2, no             Review of Systems   All other systems reviewed and are negative.      Objective   Physical Exam  Vitals reviewed.   Constitutional:       General: She is not in acute distress.     Appearance: She is not ill-appearing.   Pulmonary:      Effort: Pulmonary effort is normal.     Skin:     Coloration: Skin is not pale.     Neurological:      Mental Status: She is alert.         Assessment/Plan   Diagnoses and all orders for this visit:  Female pelvic pain  Abnormal uterine bleeding (AUB)    Patient here for follow-up of AUB and pelvic pain.  AUB is controlled with norethindrone however her pelvic pain is not.  Ultrasound imaging reviewed patient with 4 cm uterine fibroid.  Endometrial biopsy shows inactive endometrium with progestin effect.  Counseled patient on management options patient prefers hysterectomy.  Will plan for total laparoscopic hysterectomy with bilateral salpingectomy and ovarian preservation.  Procedural details and risks including pain bleeding infection injury to nearby structures and reoperation reviewed.  All questions asked and answered.       Erasmo Metzger MD 25 11:24 AM

## 2025-08-08 NOTE — PROGRESS NOTES
Subjective   Patient ID: Linnea Markham is a 50 y.o. female who presents for Menstrual Problem (Pt is here to discuss option for her heavy menstruals. Pt has no other issue or concerns at this time./No pain/No fall/PAP 6/25/25 WNL HPV Neg/ST /Pt declined  chaperone//).  Patient here for surgical consultation.  Requesting endometrial ablation for irregular menstrual period        Review of Systems   Genitourinary:  Positive for menstrual problem and pelvic pain.   All other systems reviewed and are negative.      Objective   Physical Exam  Vitals reviewed.   Constitutional:       General: She is not in acute distress.     Appearance: She is not ill-appearing.   Pulmonary:      Effort: Pulmonary effort is normal.     Skin:     Coloration: Skin is not pale.     Neurological:      Mental Status: She is alert.         Assessment/Plan   Diagnoses and all orders for this visit:  Abnormal uterine bleeding (AUB)  Menopause syndrome  Female pelvic pain    Patient here for surgical consultation.  She has a history of perimenopausal abnormal uterine.  Bleeding is irregular and heavy.  Bleeding is refractory to norethindrone.  Patient is requesting endometrial ablation however counseled patient that she is not a candidate for endometrial ablation given irregular strolls cycles and risks of endometrial hyperplasia or malignancy.  Counseled patient about risks and benefits of hysterectomy and patient will for further counseling.       Erasmo Metzger MD 08/08/25 4:43 PM

## 2025-08-15 PROBLEM — N93.9 ABNORMAL UTERINE BLEEDING (AUB): Status: ACTIVE | Noted: 2025-08-07

## 2025-08-15 PROBLEM — R10.2 FEMALE PELVIC PAIN: Status: ACTIVE | Noted: 2025-08-07

## 2025-08-25 ENCOUNTER — APPOINTMENT (OUTPATIENT)
Dept: PRIMARY CARE | Facility: CLINIC | Age: 50
End: 2025-08-25
Payer: COMMERCIAL

## 2025-08-25 VITALS
SYSTOLIC BLOOD PRESSURE: 129 MMHG | BODY MASS INDEX: 32.24 KG/M2 | WEIGHT: 182 LBS | TEMPERATURE: 97.5 F | HEART RATE: 134 BPM | DIASTOLIC BLOOD PRESSURE: 84 MMHG

## 2025-08-25 DIAGNOSIS — E78.5 HYPERLIPIDEMIA, UNSPECIFIED HYPERLIPIDEMIA TYPE: ICD-10-CM

## 2025-08-25 DIAGNOSIS — F41.8 DEPRESSION WITH ANXIETY: ICD-10-CM

## 2025-08-25 DIAGNOSIS — I10 BENIGN ESSENTIAL HYPERTENSION: ICD-10-CM

## 2025-08-25 DIAGNOSIS — I49.9 IRREGULAR HEART RHYTHM: ICD-10-CM

## 2025-08-25 DIAGNOSIS — N93.9 ABNORMAL UTERINE BLEEDING (AUB): ICD-10-CM

## 2025-08-25 DIAGNOSIS — I49.1 PAC (PREMATURE ATRIAL CONTRACTION): ICD-10-CM

## 2025-08-25 DIAGNOSIS — E78.2 MIXED HYPERLIPIDEMIA: ICD-10-CM

## 2025-08-25 DIAGNOSIS — K21.9 GASTRO-ESOPHAGEAL REFLUX DISEASE WITHOUT ESOPHAGITIS: ICD-10-CM

## 2025-08-25 DIAGNOSIS — K64.8 OTHER HEMORRHOIDS: ICD-10-CM

## 2025-08-25 DIAGNOSIS — E11.9 DIABETES MELLITUS TYPE 2 WITHOUT RETINOPATHY (MULTI): Primary | ICD-10-CM

## 2025-08-25 PROCEDURE — 3074F SYST BP LT 130 MM HG: CPT | Performed by: INTERNAL MEDICINE

## 2025-08-25 PROCEDURE — 99215 OFFICE O/P EST HI 40 MIN: CPT | Performed by: INTERNAL MEDICINE

## 2025-08-25 PROCEDURE — 93000 ELECTROCARDIOGRAM COMPLETE: CPT | Performed by: INTERNAL MEDICINE

## 2025-08-25 PROCEDURE — 3051F HG A1C>EQUAL 7.0%<8.0%: CPT | Performed by: INTERNAL MEDICINE

## 2025-08-25 PROCEDURE — 3079F DIAST BP 80-89 MM HG: CPT | Performed by: INTERNAL MEDICINE

## 2025-08-25 PROCEDURE — 4010F ACE/ARB THERAPY RXD/TAKEN: CPT | Performed by: INTERNAL MEDICINE

## 2025-08-25 RX ORDER — LOSARTAN POTASSIUM 100 MG/1
100 TABLET ORAL DAILY
Qty: 30 TABLET | Refills: 1 | Status: SHIPPED | OUTPATIENT
Start: 2025-08-25

## 2025-08-25 RX ORDER — OMEPRAZOLE 40 MG/1
40 CAPSULE, DELAYED RELEASE ORAL DAILY
Qty: 30 CAPSULE | Refills: 1 | Status: SHIPPED | OUTPATIENT
Start: 2025-08-25

## 2025-08-25 RX ORDER — AMLODIPINE BESYLATE 10 MG/1
10 TABLET ORAL DAILY
Qty: 30 TABLET | Refills: 1 | Status: SHIPPED | OUTPATIENT
Start: 2025-08-25 | End: 2025-08-29 | Stop reason: WASHOUT

## 2025-08-25 RX ORDER — HYDROCORTISONE ACETATE 25 MG/1
25 SUPPOSITORY RECTAL 2 TIMES DAILY PRN
Qty: 30 SUPPOSITORY | Refills: 0 | Status: SHIPPED | OUTPATIENT
Start: 2025-08-25 | End: 2026-08-25

## 2025-08-25 RX ORDER — ATORVASTATIN CALCIUM 20 MG/1
20 TABLET, FILM COATED ORAL NIGHTLY
Qty: 30 TABLET | Refills: 1 | Status: SHIPPED | OUTPATIENT
Start: 2025-08-25

## 2025-08-25 ASSESSMENT — ENCOUNTER SYMPTOMS
SINUS PAIN: 0
SHORTNESS OF BREATH: 0
WEAKNESS: 0
CHILLS: 0
ARTHRALGIAS: 0
COUGH: 0
ABDOMINAL PAIN: 0
MYALGIAS: 0
FREQUENCY: 0
DIARRHEA: 0
DYSURIA: 0
NERVOUS/ANXIOUS: 0
BLOOD IN STOOL: 0
PALPITATIONS: 0
CONSTIPATION: 0
CONFUSION: 0
FEVER: 0
NECK PAIN: 0
FATIGUE: 0
BACK PAIN: 0
SORE THROAT: 0
DIZZINESS: 0
HEADACHES: 0

## 2025-08-26 ENCOUNTER — APPOINTMENT (OUTPATIENT)
Dept: OPHTHALMOLOGY | Facility: CLINIC | Age: 50
End: 2025-08-26
Payer: COMMERCIAL

## 2025-08-29 ENCOUNTER — OFFICE VISIT (OUTPATIENT)
Dept: CARDIOLOGY | Facility: HOSPITAL | Age: 50
End: 2025-08-29
Payer: COMMERCIAL

## 2025-08-29 ENCOUNTER — HOSPITAL ENCOUNTER (OUTPATIENT)
Dept: CARDIOLOGY | Facility: HOSPITAL | Age: 50
Discharge: HOME | End: 2025-08-29
Payer: COMMERCIAL

## 2025-08-29 DIAGNOSIS — R06.09 DOE (DYSPNEA ON EXERTION): ICD-10-CM

## 2025-08-29 DIAGNOSIS — I47.10 SUPRAVENTRICULAR TACHYCARDIA, UNSPECIFIED: ICD-10-CM

## 2025-08-29 LAB — BODY SURFACE AREA: 1.9 M2

## 2025-08-29 PROCEDURE — 93306 TTE W/DOPPLER COMPLETE: CPT

## 2025-09-01 LAB
AORTIC VALVE PEAK VELOCITY: 1.57 M/S
AV PEAK GRADIENT: 10 MMHG
AVA (PEAK VEL): 2.92 CM2
EJECTION FRACTION APICAL 4 CHAMBER: 60.3
EJECTION FRACTION: 59 %
LEFT ATRIUM VOLUME AREA LENGTH INDEX BSA: 23 ML/M2
LEFT VENTRICLE INTERNAL DIMENSION DIASTOLE: 4.32 CM (ref 3.5–6)
LEFT VENTRICULAR OUTFLOW TRACT DIAMETER: 2.1 CM
MITRAL VALVE E/A RATIO: 0.59
RIGHT VENTRICLE FREE WALL PEAK S': 11 CM/S
TRICUSPID ANNULAR PLANE SYSTOLIC EXCURSION: 1.9 CM

## 2025-10-08 ENCOUNTER — APPOINTMENT (OUTPATIENT)
Dept: RADIOLOGY | Facility: HOSPITAL | Age: 50
End: 2025-10-08
Payer: COMMERCIAL

## 2025-10-31 ENCOUNTER — APPOINTMENT (OUTPATIENT)
Dept: OBSTETRICS AND GYNECOLOGY | Facility: CLINIC | Age: 50
End: 2025-10-31
Payer: COMMERCIAL

## 2025-12-10 ENCOUNTER — APPOINTMENT (OUTPATIENT)
Dept: ENDOCRINOLOGY | Facility: CLINIC | Age: 50
End: 2025-12-10
Payer: COMMERCIAL